# Patient Record
Sex: FEMALE | Race: BLACK OR AFRICAN AMERICAN | NOT HISPANIC OR LATINO | Employment: STUDENT | URBAN - METROPOLITAN AREA
[De-identification: names, ages, dates, MRNs, and addresses within clinical notes are randomized per-mention and may not be internally consistent; named-entity substitution may affect disease eponyms.]

---
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
## 2021-01-06 ENCOUNTER — TELEPHONE (OUTPATIENT)
Dept: FAMILY MEDICINE | Facility: CLINIC | Age: 21
End: 2021-01-06

## 2021-01-06 DIAGNOSIS — Z03.818 ENCOUNTER FOR OBSERVATION FOR SUSPECTED EXPOSURE TO OTHER BIOLOGICAL AGENTS RULED OUT: ICD-10-CM

## 2021-01-09 ENCOUNTER — CLINICAL SUPPORT (OUTPATIENT)
Dept: FAMILY MEDICINE | Facility: CLINIC | Age: 21
End: 2021-01-09
Payer: MEDICAID

## 2021-01-09 DIAGNOSIS — Z03.818 ENCOUNTER FOR OBSERVATION FOR SUSPECTED EXPOSURE TO OTHER BIOLOGICAL AGENTS RULED OUT: ICD-10-CM

## 2021-01-09 PROCEDURE — U0003 INFECTIOUS AGENT DETECTION BY NUCLEIC ACID (DNA OR RNA); SEVERE ACUTE RESPIRATORY SYNDROME CORONAVIRUS 2 (SARS-COV-2) (CORONAVIRUS DISEASE [COVID-19]), AMPLIFIED PROBE TECHNIQUE, MAKING USE OF HIGH THROUGHPUT TECHNOLOGIES AS DESCRIBED BY CMS-2020-01-R: HCPCS

## 2021-01-10 LAB — SARS-COV-2 RNA RESP QL NAA+PROBE: NOT DETECTED

## 2021-04-29 ENCOUNTER — PATIENT MESSAGE (OUTPATIENT)
Dept: RESEARCH | Facility: HOSPITAL | Age: 21
End: 2021-04-29

## 2022-01-07 ENCOUNTER — TELEPHONE (OUTPATIENT)
Dept: FAMILY MEDICINE | Facility: CLINIC | Age: 22
End: 2022-01-07
Payer: COMMERCIAL

## 2022-01-07 NOTE — TELEPHONE ENCOUNTER
We do not have anything in her records regarding her receiving COVID vaccine.  Recommend check with whomever give her the vaccination to see if they have any documentation on this.  Otherwise recommend follow-up with Dr. Miles on this.

## 2022-01-07 NOTE — TELEPHONE ENCOUNTER
----- Message from Henry Kapoor sent at 1/7/2022  9:05 AM CST -----  Contact: self  Conchita Toby would like a call back at [phone], in regards to to if she can can an exemption form for the COVID vaccine, due to the first time she received the vaccine it broke her out in hives, accelerated her heart rate.

## 2022-01-10 ENCOUNTER — TELEPHONE (OUTPATIENT)
Dept: FAMILY MEDICINE | Facility: CLINIC | Age: 22
End: 2022-01-10
Payer: COMMERCIAL

## 2022-01-10 NOTE — TELEPHONE ENCOUNTER
----- Message from Gordo Cantu sent at 1/10/2022  9:21 AM CST -----  Hello,    The following patient is returning office call. She can be contacted at [phone].    Thank you

## 2022-01-19 ENCOUNTER — TELEPHONE (OUTPATIENT)
Dept: FAMILY MEDICINE | Facility: CLINIC | Age: 22
End: 2022-01-19
Payer: COMMERCIAL

## 2022-01-19 NOTE — TELEPHONE ENCOUNTER
Spoke with patient regarding Dr. Copeland message via previous encounter and informed her that we do not have any documentation of a reaction. She states that she received the vaccine at a [facility] and was advised to follow up with PCP regarding the reaction. I offered to schedule appointment with PCP to discuss, but patient stated that she would need the exemption before Friday, or her classes would be dropped. I offered to schedule with another available provider at our [facility] for tomorrow, but patient declined stating that she needed to see someone in [place]. Patient scheduling contact number provided to patient for further assistance in scheduling with a provider to discuss at her location of choice.

## 2022-01-19 NOTE — TELEPHONE ENCOUNTER
----- Message from Henry Kapoor sent at 1/19/2022  2:49 PM CST -----  Contact: timo Santiagosisheron Luis would like a call back at [phone], in regards to her covid exemption form. She states that she has been sending  messages for 3 weeks and has yet to hear anything.

## 2022-01-20 ENCOUNTER — IMMUNIZATION (OUTPATIENT)
Dept: PRIMARY CARE CLINIC | Facility: CLINIC | Age: 22
End: 2022-01-20
Payer: COMMERCIAL

## 2022-01-20 DIAGNOSIS — Z23 NEED FOR VACCINATION: Primary | ICD-10-CM

## 2022-01-20 PROCEDURE — 0002A COVID-19, MRNA, LNP-S, PF, 30 MCG/0.3 ML DOSE VACCINE: CPT | Mod: CV19,PBBFAC | Performed by: FAMILY MEDICINE

## 2022-01-21 ENCOUNTER — NURSE TRIAGE (OUTPATIENT)
Dept: ADMINISTRATIVE | Facility: CLINIC | Age: 22
End: 2022-01-21
Payer: COMMERCIAL

## 2022-01-21 ENCOUNTER — TELEPHONE (OUTPATIENT)
Dept: FAMILY MEDICINE | Facility: CLINIC | Age: 22
End: 2022-01-21
Payer: COMMERCIAL

## 2022-01-21 NOTE — TELEPHONE ENCOUNTER
Pt reports she received 2nd dose for COVID vaccine yesterday, noticed a rash in right hand this morning. States rash is brown in color, feels tight, & is scaly. Care advice recommends pt go to [facility], pt verbalized understanding.     Reason for Disposition   Sounds like a severe, unusual reaction to the triager    Additional Information   Negative: Difficulty breathing or swallowing and starts within 2 hours after injection   Negative: Sounds like a life-threatening emergency to the triager   Negative: Fever > 104 F (40 C)   Negative: [1] Symptoms of COVID-19 (e.g., cough, fever, SOB, or others) AND [2] within 14 days of EXPOSURE (close contact) with diagnosed or suspected COVID-19 patient   Negative: [1] Symptoms of COVID-19 (e.g., cough, fever, SOB, or others) AND [2] within 14 days of being at a crowded indoor or outdoor event (e.g., concert, festival, rally, wedding)   Negative: Typical COVID-19 symptoms (e.g., cough, difficulty breathing, loss of taste or smell, runny nose, sore throat) that are NOT expected from vaccine   Negative: [1] COVID-19 exposure AND [2] no symptoms, or symptoms not typical of COVID-19    Protocols used: CORONAVIRUS (COVID-19) VACCINE QUESTIONS AND [id number]

## 2022-01-21 NOTE — TELEPHONE ENCOUNTER
----- Message from Deborah Hedrick sent at 1/21/2022  1:20 PM CST -----  Contact: Patient, [phone]  Calling because she received her 2nd Covid 19 vaccine yesterday and today she has a scally rash on her hand. Call transferred to On Call nurse. Thanks.

## 2022-01-21 NOTE — TELEPHONE ENCOUNTER
Spoke with patient and she states she is having an allergic reaction to her Covid vaccine. She states she was advised to go to her nearest Urgent Care to be assessed. States she is there awaiting her appointment. Nothing further needed.

## 2022-02-24 ENCOUNTER — TELEPHONE (OUTPATIENT)
Dept: FAMILY MEDICINE | Facility: CLINIC | Age: 22
End: 2022-02-24
Payer: COMMERCIAL

## 2022-02-24 NOTE — TELEPHONE ENCOUNTER
----- Message from Karlajanene Harvey sent at 2/24/2022  2:36 PM CST -----  Pt would like a form she have filled out from her apartment complex. The form is stating her dog is with her for emotional support. Please give her a call at .[phone]. x. El

## 2022-02-25 ENCOUNTER — PATIENT MESSAGE (OUTPATIENT)
Dept: FAMILY MEDICINE | Facility: CLINIC | Age: 22
End: 2022-02-25
Payer: COMMERCIAL

## 2022-02-25 ENCOUNTER — TELEPHONE (OUTPATIENT)
Dept: FAMILY MEDICINE | Facility: CLINIC | Age: 22
End: 2022-02-25
Payer: COMMERCIAL

## 2022-02-25 NOTE — TELEPHONE ENCOUNTER
----- Message from Leroy Franklin sent at 2/25/2022  3:48 PM CST -----  Contact: self [phone]  Pt stated sent information via my chart did not allow her to send through email.    Please call and advise

## 2022-03-15 ENCOUNTER — OFFICE VISIT (OUTPATIENT)
Dept: FAMILY MEDICINE | Facility: CLINIC | Age: 22
End: 2022-03-15
Payer: COMMERCIAL

## 2022-03-15 VITALS
HEART RATE: 92 BPM | WEIGHT: 185.31 LBS | HEIGHT: 64 IN | BODY MASS INDEX: 31.64 KG/M2 | DIASTOLIC BLOOD PRESSURE: 82 MMHG | TEMPERATURE: 99 F | OXYGEN SATURATION: 99 % | SYSTOLIC BLOOD PRESSURE: 114 MMHG

## 2022-03-15 DIAGNOSIS — F41.9 ANXIETY: ICD-10-CM

## 2022-03-15 DIAGNOSIS — E55.9 VITAMIN D DEFICIENCY: ICD-10-CM

## 2022-03-15 DIAGNOSIS — Z00.00 ANNUAL PHYSICAL EXAM: Primary | ICD-10-CM

## 2022-03-15 DIAGNOSIS — Z11.59 NEED FOR HEPATITIS C SCREENING TEST: ICD-10-CM

## 2022-03-15 DIAGNOSIS — Z11.4 ENCOUNTER FOR SCREENING FOR HIV: ICD-10-CM

## 2022-03-15 DIAGNOSIS — Z11.3 SCREENING FOR STD (SEXUALLY TRANSMITTED DISEASE): ICD-10-CM

## 2022-03-15 DIAGNOSIS — E01.0 THYROMEGALY: ICD-10-CM

## 2022-03-15 LAB
BACTERIA #/AREA URNS HPF: ABNORMAL /HPF
BILIRUB UR QL STRIP: NEGATIVE
CLARITY UR: CLEAR
COLOR UR: YELLOW
GLUCOSE UR QL STRIP: NEGATIVE
HGB UR QL STRIP: NEGATIVE
KETONES UR QL STRIP: NEGATIVE
LEUKOCYTE ESTERASE UR QL STRIP: NEGATIVE
MICROSCOPIC COMMENT: ABNORMAL
NITRITE UR QL STRIP: POSITIVE
PH UR STRIP: 8 [PH] (ref 5–8)
PROT UR QL STRIP: NEGATIVE
RBC #/AREA URNS HPF: 0 /HPF (ref 0–4)
SP GR UR STRIP: 1.01 (ref 1–1.03)
SQUAMOUS #/AREA URNS HPF: 3 /HPF
URN SPEC COLLECT METH UR: ABNORMAL
WBC #/AREA URNS HPF: 3 /HPF (ref 0–5)

## 2022-03-15 PROCEDURE — 99999 PR PBB SHADOW E&M-EST. PATIENT-LVL V: ICD-10-PCS | Mod: PBBFAC,,, | Performed by: NURSE PRACTITIONER

## 2022-03-15 PROCEDURE — 87491 CHLMYD TRACH DNA AMP PROBE: CPT | Performed by: NURSE PRACTITIONER

## 2022-03-15 PROCEDURE — 3074F SYST BP LT 130 MM HG: CPT | Mod: CPTII,S$GLB,, | Performed by: NURSE PRACTITIONER

## 2022-03-15 PROCEDURE — 3079F DIAST BP 80-89 MM HG: CPT | Mod: CPTII,S$GLB,, | Performed by: NURSE PRACTITIONER

## 2022-03-15 PROCEDURE — 99999 PR PBB SHADOW E&M-EST. PATIENT-LVL V: CPT | Mod: PBBFAC,,, | Performed by: NURSE PRACTITIONER

## 2022-03-15 PROCEDURE — 1160F RVW MEDS BY RX/DR IN RCRD: CPT | Mod: CPTII,S$GLB,, | Performed by: NURSE PRACTITIONER

## 2022-03-15 PROCEDURE — 99395 PREV VISIT EST AGE 18-39: CPT | Mod: S$GLB,,, | Performed by: NURSE PRACTITIONER

## 2022-03-15 PROCEDURE — 1160F PR REVIEW ALL MEDS BY PRESCRIBER/CLIN PHARMACIST DOCUMENTED: ICD-10-PCS | Mod: CPTII,S$GLB,, | Performed by: NURSE PRACTITIONER

## 2022-03-15 PROCEDURE — 3074F PR MOST RECENT SYSTOLIC BLOOD PRESSURE < 130 MM HG: ICD-10-PCS | Mod: CPTII,S$GLB,, | Performed by: NURSE PRACTITIONER

## 2022-03-15 PROCEDURE — 1159F MED LIST DOCD IN RCRD: CPT | Mod: CPTII,S$GLB,, | Performed by: NURSE PRACTITIONER

## 2022-03-15 PROCEDURE — 3079F PR MOST RECENT DIASTOLIC BLOOD PRESSURE 80-89 MM HG: ICD-10-PCS | Mod: CPTII,S$GLB,, | Performed by: NURSE PRACTITIONER

## 2022-03-15 PROCEDURE — 87591 N.GONORRHOEAE DNA AMP PROB: CPT | Performed by: NURSE PRACTITIONER

## 2022-03-15 PROCEDURE — 1159F PR MEDICATION LIST DOCUMENTED IN MEDICAL RECORD: ICD-10-PCS | Mod: CPTII,S$GLB,, | Performed by: NURSE PRACTITIONER

## 2022-03-15 PROCEDURE — 3008F PR BODY MASS INDEX (BMI) DOCUMENTED: ICD-10-PCS | Mod: CPTII,S$GLB,, | Performed by: NURSE PRACTITIONER

## 2022-03-15 PROCEDURE — 81000 URINALYSIS NONAUTO W/SCOPE: CPT | Mod: PO | Performed by: NURSE PRACTITIONER

## 2022-03-15 PROCEDURE — 3008F BODY MASS INDEX DOCD: CPT | Mod: CPTII,S$GLB,, | Performed by: NURSE PRACTITIONER

## 2022-03-15 PROCEDURE — 99395 PR PREVENTIVE VISIT,EST,18-39: ICD-10-PCS | Mod: S$GLB,,, | Performed by: NURSE PRACTITIONER

## 2022-03-15 RX ORDER — NORETHINDRONE ACETATE/ETHINYL ESTRADIOL AND FERROUS FUMARATE 1MG-20(24)
1 KIT ORAL DAILY
Status: ON HOLD | COMMUNITY
Start: 2022-03-10 | End: 2023-12-29 | Stop reason: HOSPADM

## 2022-03-15 NOTE — PATIENT INSTRUCTIONS
Call psychiatry for an appt, see the list of numbers provided  Continue current medications  RTC as needed  Report to ER immediately if symptoms worsen
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
## 2022-03-17 ENCOUNTER — TELEPHONE (OUTPATIENT)
Dept: FAMILY MEDICINE | Facility: CLINIC | Age: 22
End: 2022-03-17
Payer: COMMERCIAL

## 2022-03-17 DIAGNOSIS — R82.90 ABNORMAL URINE FINDINGS: Primary | ICD-10-CM

## 2022-03-17 LAB
C TRACH DNA SPEC QL NAA+PROBE: NOT DETECTED
N GONORRHOEA DNA SPEC QL NAA+PROBE: NOT DETECTED

## 2022-03-18 ENCOUNTER — TELEPHONE (OUTPATIENT)
Dept: FAMILY MEDICINE | Facility: CLINIC | Age: 22
End: 2022-03-18
Payer: COMMERCIAL

## 2022-03-18 RX ORDER — NITROFURANTOIN 25; 75 MG/1; MG/1
100 CAPSULE ORAL 2 TIMES DAILY
Qty: 10 CAPSULE | Refills: 0 | Status: SHIPPED | OUTPATIENT
Start: 2022-03-18 | End: 2022-03-23

## 2022-04-01 ENCOUNTER — TELEPHONE (OUTPATIENT)
Dept: FAMILY MEDICINE | Facility: CLINIC | Age: 22
End: 2022-04-01
Payer: COMMERCIAL

## 2022-04-01 ENCOUNTER — PATIENT MESSAGE (OUTPATIENT)
Dept: FAMILY MEDICINE | Facility: CLINIC | Age: 22
End: 2022-04-01
Payer: COMMERCIAL

## 2022-04-01 NOTE — TELEPHONE ENCOUNTER
----- Message from Jessica Cabrera sent at 4/1/2022  9:31 AM CDT -----  Contact: Conchita  Patient is calling to speak with the nurse regarding the request for signed form for patient residential needs for a service animal. Reports the form was sent through the [employer] portal to both NADYA Fox and Dr Miles. Patient is requesting a call back at [phone] today to confirm.   Thanks,  RP

## 2022-05-27 ENCOUNTER — LAB VISIT (OUTPATIENT)
Dept: LAB | Facility: HOSPITAL | Age: 22
End: 2022-05-27
Attending: NURSE PRACTITIONER
Payer: COMMERCIAL

## 2022-05-27 ENCOUNTER — PATIENT MESSAGE (OUTPATIENT)
Dept: FAMILY MEDICINE | Facility: CLINIC | Age: 22
End: 2022-05-27
Payer: COMMERCIAL

## 2022-05-27 DIAGNOSIS — Z11.3 SCREENING FOR STD (SEXUALLY TRANSMITTED DISEASE): ICD-10-CM

## 2022-05-27 DIAGNOSIS — Z11.59 NEED FOR HEPATITIS C SCREENING TEST: ICD-10-CM

## 2022-05-27 DIAGNOSIS — Z00.00 ANNUAL PHYSICAL EXAM: ICD-10-CM

## 2022-05-27 DIAGNOSIS — E55.9 VITAMIN D DEFICIENCY: ICD-10-CM

## 2022-05-27 DIAGNOSIS — Z11.4 ENCOUNTER FOR SCREENING FOR HIV: ICD-10-CM

## 2022-05-27 LAB
25(OH)D3+25(OH)D2 SERPL-MCNC: 9 NG/ML (ref 30–96)
ALBUMIN SERPL BCP-MCNC: 3.9 G/DL (ref 3.5–5.2)
ALP SERPL-CCNC: 74 U/L (ref 55–135)
ALT SERPL W/O P-5'-P-CCNC: 17 U/L (ref 10–44)
ANION GAP SERPL CALC-SCNC: 8 MMOL/L (ref 8–16)
AST SERPL-CCNC: 16 U/L (ref 10–40)
BASOPHILS # BLD AUTO: 0.04 K/UL (ref 0–0.2)
BASOPHILS NFR BLD: 0.9 % (ref 0–1.9)
BILIRUB SERPL-MCNC: 0.3 MG/DL (ref 0.1–1)
BUN SERPL-MCNC: 10 MG/DL (ref 6–20)
CALCIUM SERPL-MCNC: 9.8 MG/DL (ref 8.7–10.5)
CHLORIDE SERPL-SCNC: 104 MMOL/L (ref 95–110)
CHOLEST SERPL-MCNC: 176 MG/DL (ref 120–199)
CHOLEST/HDLC SERPL: 4.2 {RATIO} (ref 2–5)
CO2 SERPL-SCNC: 25 MMOL/L (ref 23–29)
CREAT SERPL-MCNC: 0.8 MG/DL (ref 0.5–1.4)
DIFFERENTIAL METHOD: NORMAL
EOSINOPHIL # BLD AUTO: 0.1 K/UL (ref 0–0.5)
EOSINOPHIL NFR BLD: 1.6 % (ref 0–8)
ERYTHROCYTE [DISTWIDTH] IN BLOOD BY AUTOMATED COUNT: 13.2 % (ref 11.5–14.5)
EST. GFR  (AFRICAN AMERICAN): >60 ML/MIN/1.73 M^2
EST. GFR  (NON AFRICAN AMERICAN): >60 ML/MIN/1.73 M^2
GLUCOSE SERPL-MCNC: 91 MG/DL (ref 70–110)
HCT VFR BLD AUTO: 42.7 % (ref 37–48.5)
HDLC SERPL-MCNC: 42 MG/DL (ref 40–75)
HDLC SERPL: 23.9 % (ref 20–50)
HGB BLD-MCNC: 13.9 G/DL (ref 12–16)
IMM GRANULOCYTES # BLD AUTO: 0 K/UL (ref 0–0.04)
IMM GRANULOCYTES NFR BLD AUTO: 0 % (ref 0–0.5)
LDLC SERPL CALC-MCNC: 118.2 MG/DL (ref 63–159)
LYMPHOCYTES # BLD AUTO: 2 K/UL (ref 1–4.8)
LYMPHOCYTES NFR BLD: 47.2 % (ref 18–48)
MCH RBC QN AUTO: 29.9 PG (ref 27–31)
MCHC RBC AUTO-ENTMCNC: 32.6 G/DL (ref 32–36)
MCV RBC AUTO: 92 FL (ref 82–98)
MONOCYTES # BLD AUTO: 0.3 K/UL (ref 0.3–1)
MONOCYTES NFR BLD: 7.4 % (ref 4–15)
NEUTROPHILS # BLD AUTO: 1.9 K/UL (ref 1.8–7.7)
NEUTROPHILS NFR BLD: 42.9 % (ref 38–73)
NONHDLC SERPL-MCNC: 134 MG/DL
NRBC BLD-RTO: 0 /100 WBC
PLATELET # BLD AUTO: 322 K/UL (ref 150–450)
PMV BLD AUTO: 10.7 FL (ref 9.2–12.9)
POTASSIUM SERPL-SCNC: 3.6 MMOL/L (ref 3.5–5.1)
PROT SERPL-MCNC: 7.1 G/DL (ref 6–8.4)
RBC # BLD AUTO: 4.65 M/UL (ref 4–5.4)
SODIUM SERPL-SCNC: 137 MMOL/L (ref 136–145)
TRIGL SERPL-MCNC: 79 MG/DL (ref 30–150)
TSH SERPL DL<=0.005 MIU/L-ACNC: 2.24 UIU/ML (ref 0.4–4)
WBC # BLD AUTO: 4.32 K/UL (ref 3.9–12.7)

## 2022-05-27 PROCEDURE — 86592 SYPHILIS TEST NON-TREP QUAL: CPT | Performed by: NURSE PRACTITIONER

## 2022-05-27 PROCEDURE — 80053 COMPREHEN METABOLIC PANEL: CPT | Performed by: NURSE PRACTITIONER

## 2022-05-27 PROCEDURE — 86696 HERPES SIMPLEX TYPE 2 TEST: CPT | Performed by: NURSE PRACTITIONER

## 2022-05-27 PROCEDURE — 86695 HERPES SIMPLEX TYPE 1 TEST: CPT | Performed by: NURSE PRACTITIONER

## 2022-05-27 PROCEDURE — 87389 HIV-1 AG W/HIV-1&-2 AB AG IA: CPT | Performed by: NURSE PRACTITIONER

## 2022-05-27 PROCEDURE — 36415 COLL VENOUS BLD VENIPUNCTURE: CPT | Mod: PO | Performed by: NURSE PRACTITIONER

## 2022-05-27 PROCEDURE — 80061 LIPID PANEL: CPT | Performed by: NURSE PRACTITIONER

## 2022-05-27 PROCEDURE — 80074 ACUTE HEPATITIS PANEL: CPT | Performed by: NURSE PRACTITIONER

## 2022-05-27 PROCEDURE — 85025 COMPLETE CBC W/AUTO DIFF WBC: CPT | Performed by: NURSE PRACTITIONER

## 2022-05-27 PROCEDURE — 84443 ASSAY THYROID STIM HORMONE: CPT | Performed by: NURSE PRACTITIONER

## 2022-05-27 PROCEDURE — 82306 VITAMIN D 25 HYDROXY: CPT | Performed by: NURSE PRACTITIONER

## 2022-05-28 LAB — RPR SER QL: NORMAL

## 2022-05-30 LAB
HAV IGM SERPL QL IA: NEGATIVE
HBV CORE IGM SERPL QL IA: NEGATIVE
HBV SURFACE AG SERPL QL IA: NEGATIVE
HCV AB SERPL QL IA: NEGATIVE
HCV AB SERPL QL IA: NEGATIVE
HIV 1+2 AB+HIV1 P24 AG SERPL QL IA: NEGATIVE

## 2022-06-01 DIAGNOSIS — E55.9 VITAMIN D DEFICIENCY: ICD-10-CM

## 2022-06-01 LAB
HSV1 IGG SERPL QL IA: POSITIVE
HSV2 IGG SERPL QL IA: NEGATIVE

## 2022-06-01 NOTE — TELEPHONE ENCOUNTER
----- Message from Dominique Arauz NP sent at 5/31/2022  7:53 AM CDT -----  Per nurse response, pt not taking vitamin D. Recommend restarting at current dose; repeat vit D in 3 m.

## 2022-06-02 RX ORDER — VIT C/E/ZN/COPPR/LUTEIN/ZEAXAN 250MG-90MG
1000 CAPSULE ORAL DAILY
Qty: 90 CAPSULE | Refills: 1 | Status: SHIPPED | OUTPATIENT
Start: 2022-06-02 | End: 2023-02-09 | Stop reason: SDUPTHER

## 2022-07-21 ENCOUNTER — HOSPITAL ENCOUNTER (OUTPATIENT)
Dept: RADIOLOGY | Facility: HOSPITAL | Age: 22
Discharge: HOME | End: 2022-07-21
Attending: NURSE PRACTITIONER
Payer: COMMERCIAL

## 2022-07-21 DIAGNOSIS — E01.0 THYROMEGALY: ICD-10-CM

## 2022-07-21 PROCEDURE — 76536 US EXAM OF HEAD AND NECK: CPT | Mod: 26,,, | Performed by: RADIOLOGY

## 2022-07-21 PROCEDURE — 76536 US EXAM OF HEAD AND NECK: CPT | Mod: TC,PO

## 2022-07-21 PROCEDURE — 76536 US SOFT TISSUE HEAD NECK THYROID: ICD-10-PCS | Mod: 26,,, | Performed by: RADIOLOGY

## 2022-08-09 ENCOUNTER — OFFICE VISIT (OUTPATIENT)
Dept: FAMILY MEDICINE | Facility: CLINIC | Age: 22
End: 2022-08-09
Payer: COMMERCIAL

## 2022-08-09 VITALS
OXYGEN SATURATION: 97 % | SYSTOLIC BLOOD PRESSURE: 120 MMHG | TEMPERATURE: 98 F | DIASTOLIC BLOOD PRESSURE: 70 MMHG | BODY MASS INDEX: 31.19 KG/M2 | WEIGHT: 182.69 LBS | HEART RATE: 92 BPM | HEIGHT: 64 IN

## 2022-08-09 DIAGNOSIS — N89.8 VAGINAL DISCHARGE: Primary | ICD-10-CM

## 2022-08-09 DIAGNOSIS — N91.2 AMENORRHEA: ICD-10-CM

## 2022-08-09 LAB — B-HCG UR QL: NEGATIVE

## 2022-08-09 PROCEDURE — 1159F MED LIST DOCD IN RCRD: CPT | Mod: CPTII,S$GLB,, | Performed by: PHYSICIAN ASSISTANT

## 2022-08-09 PROCEDURE — 87491 CHLMYD TRACH DNA AMP PROBE: CPT | Performed by: PHYSICIAN ASSISTANT

## 2022-08-09 PROCEDURE — 87591 N.GONORRHOEAE DNA AMP PROB: CPT | Performed by: PHYSICIAN ASSISTANT

## 2022-08-09 PROCEDURE — 3078F PR MOST RECENT DIASTOLIC BLOOD PRESSURE < 80 MM HG: ICD-10-PCS | Mod: CPTII,S$GLB,, | Performed by: PHYSICIAN ASSISTANT

## 2022-08-09 PROCEDURE — 1160F RVW MEDS BY RX/DR IN RCRD: CPT | Mod: CPTII,S$GLB,, | Performed by: PHYSICIAN ASSISTANT

## 2022-08-09 PROCEDURE — 3078F DIAST BP <80 MM HG: CPT | Mod: CPTII,S$GLB,, | Performed by: PHYSICIAN ASSISTANT

## 2022-08-09 PROCEDURE — 3008F BODY MASS INDEX DOCD: CPT | Mod: CPTII,S$GLB,, | Performed by: PHYSICIAN ASSISTANT

## 2022-08-09 PROCEDURE — 1159F PR MEDICATION LIST DOCUMENTED IN MEDICAL RECORD: ICD-10-PCS | Mod: CPTII,S$GLB,, | Performed by: PHYSICIAN ASSISTANT

## 2022-08-09 PROCEDURE — 99213 PR OFFICE/OUTPT VISIT, EST, LEVL III, 20-29 MIN: ICD-10-PCS | Mod: S$GLB,,, | Performed by: PHYSICIAN ASSISTANT

## 2022-08-09 PROCEDURE — 1160F PR REVIEW ALL MEDS BY PRESCRIBER/CLIN PHARMACIST DOCUMENTED: ICD-10-PCS | Mod: CPTII,S$GLB,, | Performed by: PHYSICIAN ASSISTANT

## 2022-08-09 PROCEDURE — 99213 OFFICE O/P EST LOW 20 MIN: CPT | Mod: S$GLB,,, | Performed by: PHYSICIAN ASSISTANT

## 2022-08-09 PROCEDURE — 99999 PR PBB SHADOW E&M-EST. PATIENT-LVL IV: CPT | Mod: PBBFAC,,, | Performed by: PHYSICIAN ASSISTANT

## 2022-08-09 PROCEDURE — 3074F SYST BP LT 130 MM HG: CPT | Mod: CPTII,S$GLB,, | Performed by: PHYSICIAN ASSISTANT

## 2022-08-09 PROCEDURE — 3074F PR MOST RECENT SYSTOLIC BLOOD PRESSURE < 130 MM HG: ICD-10-PCS | Mod: CPTII,S$GLB,, | Performed by: PHYSICIAN ASSISTANT

## 2022-08-09 PROCEDURE — 81025 URINE PREGNANCY TEST: CPT | Performed by: PHYSICIAN ASSISTANT

## 2022-08-09 PROCEDURE — 3008F PR BODY MASS INDEX (BMI) DOCUMENTED: ICD-10-PCS | Mod: CPTII,S$GLB,, | Performed by: PHYSICIAN ASSISTANT

## 2022-08-09 PROCEDURE — 99999 PR PBB SHADOW E&M-EST. PATIENT-LVL IV: ICD-10-PCS | Mod: PBBFAC,,, | Performed by: PHYSICIAN ASSISTANT

## 2022-08-09 RX ORDER — VALACYCLOVIR HYDROCHLORIDE 1 G/1
1000 TABLET, FILM COATED ORAL DAILY
Status: ON HOLD | COMMUNITY
Start: 2022-05-26 | End: 2024-01-29

## 2022-08-09 NOTE — PROGRESS NOTES
Assessment/Plan:    Problem List Items Addressed This Visit        Renal/    Vaginal discharge - Primary    Overview     -symptoms likely due to vaginitis   -will obtain vaginosis screen and STD testing  -follow up if no improvement or worsening of symptoms             Relevant Orders    C. trachomatis/N. gonorrhoeae by AMP DNA    VAGINOSIS SCREEN BY DNA PROBE    Amenorrhea    Overview     -LMP >4 months ago  -will check UPT to r/o pregnancy  -refer to GYN for further evaluation           Relevant Orders    Pregnancy, urine rapid    Ambulatory referral/consult to Gynecology          Follow up if symptoms worsen or fail to improve.    Sailaja Waktins PA-C  _____________________________________________________________________________________________________________________________________________________    CC: vaginal discharge     HPI: Patient is in clinic today as an established patient here for evaluation of abnormal vaginal discharge. Symptom onset was about 4 days ago. She reports associated vaginal irritation and pruritis. She denies fever, chills, blisters, bumps, burning, bleeding, dyspareunia, lesions, odor, pain, post coital bleeding, or urinary symptoms. STI Risk: Possible STD exposure. She has tried Monistat cream due to suspected yeast infection with no improvement in symptoms. She stated that her LMP was about 4 months ago. She was previously on oral contraceptives, but discontinued about 4 months ago. She reports having 2 cycles per month prior to discontinuing her birth control. She reports taking a home pregnancy test last month which was negative. She is not established with GYN. No other complaints today.     Past Medical History:   Diagnosis Date    Migraine      History reviewed. No pertinent surgical history.  Review of patient's allergies indicates:   Allergen Reactions    Covid-19 vaccine, mrna, [id number], lnp-s (pfizer) Rash     Social History     Tobacco Use    Smoking status: Never  "Smoker    Smokeless tobacco: Never Used   Substance Use Topics    Alcohol use: Not Currently     Family History   Problem Relation Age of Onset    Diabetes Maternal Grandmother     Glaucoma Maternal Grandmother      Current Outpatient Medications on File Prior to Visit   Medication Sig Dispense Refill    valACYclovir (VALTREX) 1000 MG tablet Take 1,000 mg by mouth once daily.      cholecalciferol, vitamin D3, (VITAMIN D3) 25 mcg (1,000 unit) capsule Take 1 capsule (1,000 Units total) by mouth once daily. (Patient not taking: Reported on 8/9/2022) 90 capsule 1    desogestreL-ethinyl estradioL (APRI) 0.15-0.03 mg per tablet Take 1 tablet by mouth.      KARIVA, 28, 0.15-0.02 mgx21 /0.01 mg x 5 per tablet Take 1 tablet by mouth once daily. (Patient not taking: No sig reported) 27 tablet 11    JOSE RAMON 24 FE 1 mg-20 mcg (24)/75 mg (4) per tablet Take 1 tablet by mouth once daily.       No current facility-administered medications on file prior to visit.       Review of Systems   Constitutional: Negative for chills, diaphoresis, fatigue and fever.   HENT: Negative for congestion, ear pain, postnasal drip, sinus pain and sore throat.    Eyes: Negative for pain and redness.   Respiratory: Negative for cough, chest tightness and shortness of breath.    Cardiovascular: Negative for chest pain and leg swelling.   Gastrointestinal: Negative for abdominal pain, constipation, diarrhea, nausea and vomiting.   Genitourinary: Positive for menstrual problem and vaginal discharge. Negative for dyspareunia, dysuria, flank pain, frequency, genital sores, hematuria, pelvic pain, urgency, vaginal bleeding and vaginal pain.   Musculoskeletal: Negative for arthralgias and joint swelling.   Skin: Negative for rash.   Neurological: Negative for dizziness, syncope and headaches.       Vitals:    08/09/22 0745   BP: 120/70   Pulse: 92   Temp: 98.3 °F (36.8 °C)   SpO2: 97%   Weight: 82.9 kg (182 lb 11.2 oz)   Height: 5' 4" (1.626 m) "       Wt Readings from Last 3 Encounters:   08/09/22 82.9 kg (182 lb 11.2 oz)   03/15/22 84.1 kg (185 lb 4.8 oz)   11/06/20 83.1 kg (183 lb 3.2 oz) (95 %, Z= 1.69)*     * Growth percentiles are based on [facility] (Girls, 2-20 Years) data.       Physical Exam  Constitutional:       General: She is not in acute distress.     Appearance: Normal appearance. She is well-developed.   HENT:      Head: Normocephalic and atraumatic.   Eyes:      Conjunctiva/sclera: Conjunctivae normal.   Cardiovascular:      Rate and Rhythm: Normal rate and regular rhythm.      Pulses: Normal pulses.      Heart sounds: Normal heart sounds. No murmur heard.  Pulmonary:      Effort: Pulmonary effort is normal. No respiratory distress.      Breath sounds: Normal breath sounds.   Abdominal:      General: Bowel sounds are normal. There is no distension.      Palpations: Abdomen is soft.      Tenderness: There is no abdominal tenderness.   Musculoskeletal:         General: Normal range of motion.      Cervical back: Normal range of motion and neck supple.   Skin:     General: Skin is warm and dry.      Findings: No rash.   Neurological:      General: No focal deficit present.      Mental Status: She is alert and oriented to person, place, and time.         Health Maintenance   Topic Date Due    Chlamydia Screening  03/15/2023    TETANUS VACCINE  10/14/2023    Hepatitis C Screening  Completed    Lipid Panel  Completed    HPV Vaccines  Completed

## 2022-08-10 ENCOUNTER — TELEPHONE (OUTPATIENT)
Dept: FAMILY MEDICINE | Facility: CLINIC | Age: 22
End: 2022-08-10
Payer: COMMERCIAL

## 2022-08-10 DIAGNOSIS — N89.8 VAGINAL DISCHARGE: Primary | ICD-10-CM

## 2022-08-10 DIAGNOSIS — A74.9 CHLAMYDIA: ICD-10-CM

## 2022-08-10 LAB
C TRACH DNA SPEC QL NAA+PROBE: DETECTED
N GONORRHOEA DNA SPEC QL NAA+PROBE: NOT DETECTED

## 2022-08-10 RX ORDER — DOXYCYCLINE HYCLATE 100 MG
100 TABLET ORAL 2 TIMES DAILY
Qty: 14 TABLET | Refills: 0 | Status: SHIPPED | OUTPATIENT
Start: 2022-08-10 | End: 2022-08-17

## 2022-08-10 NOTE — TELEPHONE ENCOUNTER
Please let the patient know that I have sent antibiotics to her pharmacy for recent test results. I have also put in another order for vaginosis screen. Please have her come to the clinic for testing.     I have signed for the following orders AND/OR meds.  Please call the patient and ask the patient to schedule the testing AND/OR inform about any medications that were sent. Medications have been sent to pharmacy listed below      Orders Placed This Encounter   Procedures    VAGINOSIS SCREEN BY DNA PROBE     Standing Status:   Future     Standing Expiration Date:   10/9/2023     Order Specific Question:   Release to patient     Answer:   Immediate       Medications Ordered This Encounter   Medications    doxycycline (VIBRA-TABS) 100 MG tablet     Sig: Take 1 tablet (100 mg total) by mouth 2 (two) times daily. for 7 days     Dispense:  14 tablet     Refill:  0         [facility] - [id number] [facility]  [place] [address]  Phone: [phone] Fax: [phone]

## 2022-08-10 NOTE — TELEPHONE ENCOUNTER
Spoke with patient. Patient states that she would like it to be sent to [facility] and she will have a friend pick it up for her .

## 2022-08-10 NOTE — TELEPHONE ENCOUNTER
Please let the patient know that I am waiting on other results before sending in her medication for treatment. Please find out which pharmacy she would like for me to send the medication to. Thanks.

## 2022-08-10 NOTE — PROGRESS NOTES
Results have been released via [facility]. Please verify that these have been viewed by patient. If not, please call patient with results.     I have sent a message to them with the following interpretation (see below).    I have reviewed your recent STD screen which detected Chlamydia. This is a sexually transmitted disease and I highly recommend that your partner gets treated also. I will send in an antibiotic to your pharmacy for you to start taking. Gonorrhea was not detected and your pregnancy test was negative.     Please do not hesitate to call or message with any additional questions or concerns.    Sailaja Watkins PA-C

## 2022-08-10 NOTE — TELEPHONE ENCOUNTER
Good morning,    I reviewed my test results on the [facility] and I would like to know if medication could be delivered (mailed)? My mom tested positive for Covid at the time of using my vehicle in result I would not be able to pick it up at a pharmacy. Thank you in advance for your help

## 2022-08-10 NOTE — TELEPHONE ENCOUNTER
Spoke with patient and notified her of results. Patient reports unable to come do vaginal swab today but will give us a call tomorrow and try to come then.

## 2022-08-16 ENCOUNTER — TELEPHONE (OUTPATIENT)
Dept: FAMILY MEDICINE | Facility: CLINIC | Age: 22
End: 2022-08-16
Payer: COMMERCIAL

## 2022-08-16 NOTE — TELEPHONE ENCOUNTER
Patient was only treated with doxycyline for infection. Please have patient come in for vaginosis screen as ordered last week.

## 2022-08-16 NOTE — TELEPHONE ENCOUNTER
Pt states that she still has a yeast infection after taking all of meds prescribed. Pt asking if she should come in to complete another swab or urine specimen. Please advise.

## 2022-08-17 ENCOUNTER — PATIENT MESSAGE (OUTPATIENT)
Dept: FAMILY MEDICINE | Facility: CLINIC | Age: 22
End: 2022-08-17
Payer: COMMERCIAL

## 2022-11-09 ENCOUNTER — E-VISIT (OUTPATIENT)
Dept: FAMILY MEDICINE | Facility: CLINIC | Age: 22
End: 2022-11-09
Payer: COMMERCIAL

## 2022-11-09 ENCOUNTER — LAB VISIT (OUTPATIENT)
Dept: LAB | Facility: HOSPITAL | Age: 22
End: 2022-11-09
Attending: INTERNAL MEDICINE
Payer: COMMERCIAL

## 2022-11-09 ENCOUNTER — PATIENT MESSAGE (OUTPATIENT)
Dept: FAMILY MEDICINE | Facility: CLINIC | Age: 22
End: 2022-11-09

## 2022-11-09 DIAGNOSIS — R82.90 ABNORMAL URINE: Primary | ICD-10-CM

## 2022-11-09 DIAGNOSIS — B96.89 BV (BACTERIAL VAGINOSIS): ICD-10-CM

## 2022-11-09 DIAGNOSIS — A74.9 CHLAMYDIA: ICD-10-CM

## 2022-11-09 DIAGNOSIS — N76.0 BV (BACTERIAL VAGINOSIS): ICD-10-CM

## 2022-11-09 DIAGNOSIS — R82.90 ABNORMAL URINE: ICD-10-CM

## 2022-11-09 DIAGNOSIS — N89.8 VAGINAL DISCHARGE: ICD-10-CM

## 2022-11-09 DIAGNOSIS — Z20.2 POSSIBLE EXPOSURE TO STD: ICD-10-CM

## 2022-11-09 LAB
BACTERIA #/AREA URNS HPF: ABNORMAL /HPF
BILIRUB UR QL STRIP: NEGATIVE
CLARITY UR: CLEAR
COLOR UR: YELLOW
GLUCOSE UR QL STRIP: NEGATIVE
HGB UR QL STRIP: NEGATIVE
KETONES UR QL STRIP: NEGATIVE
LEUKOCYTE ESTERASE UR QL STRIP: NEGATIVE
MICROSCOPIC COMMENT: ABNORMAL
NITRITE UR QL STRIP: NEGATIVE
PH UR STRIP: 7.5 [PH] (ref 5–8)
PROT UR QL STRIP: NEGATIVE
RBC #/AREA URNS HPF: 1 /HPF (ref 0–4)
SP GR UR STRIP: 1.01 (ref 1–1.03)
SQUAMOUS #/AREA URNS HPF: 4 /HPF
URN SPEC COLLECT METH UR: NORMAL
WBC #/AREA URNS HPF: 1 /HPF (ref 0–5)

## 2022-11-09 PROCEDURE — 87591 N.GONORRHOEAE DNA AMP PROB: CPT | Performed by: NURSE PRACTITIONER

## 2022-11-09 PROCEDURE — 87491 CHLMYD TRACH DNA AMP PROBE: CPT | Performed by: NURSE PRACTITIONER

## 2022-11-09 PROCEDURE — 87086 URINE CULTURE/COLONY COUNT: CPT | Performed by: NURSE PRACTITIONER

## 2022-11-09 PROCEDURE — 99213 PR OFFICE/OUTPT VISIT, EST, LEVL III, 20-29 MIN: ICD-10-PCS | Mod: S$GLB,,, | Performed by: NURSE PRACTITIONER

## 2022-11-09 PROCEDURE — 99213 OFFICE O/P EST LOW 20 MIN: CPT | Mod: S$GLB,,, | Performed by: NURSE PRACTITIONER

## 2022-11-09 PROCEDURE — 81000 URINALYSIS NONAUTO W/SCOPE: CPT | Mod: PO | Performed by: NURSE PRACTITIONER

## 2022-11-09 PROCEDURE — 81514 NFCT DS BV&VAGINITIS DNA ALG: CPT | Performed by: PHYSICIAN ASSISTANT

## 2022-11-09 NOTE — PROGRESS NOTES
Patient ID: Conchita Luis is a 21 y.o. female.    Chief Complaint: STD CHECK    The patient initiated a request through [employer] on 11/9/2022 for evaluation and management with a chief complaint of STD CHECK     I evaluated the questionnaire submission on 11/09/2022  .    [facility] Peq Evisit Uti Questionnaire    11/9/2022  8:22 AM CST - Filed by Patient   Do you agree to participate in an E-Visit? Yes   If you have any of the following problems, please present to your local ER or call 911:  I acknowledge   What is the main issue that you would like for your doctor to address today? std pannel   Are you able to take your vital signs? No   What symptoms do you currently have? Change in urine appearance or smell   When did your symptoms first appear? 11/7/2022   List what you have done or taken to help your symptoms. none   Please indicate whether you have had the following symptoms during the past 24 hours     Urgent urination (a sudden and uncontrollable urge to urinate) None   Frequent urination of small amounts of urine (going to the toilet very often) None   Burning pain when urinating None   Incomplete bladder emptying (still feel like you need to urinate again after urination) None   Pain not associated with urination in the lower abdomen below the belly button) None   What does your urine look like? Cloudy   Blood seen in the urine (without menstrual cycle) None   Flank pain (pain in one or both sides of the lower back) None   Abnormal Vaginal Discharge (abnormal amount, color and/or odor) Moderate   Discharge from the urethra (urinary opening) without urination None   High body temperature/fever? None-<99.5   Please rate how much discomfort you have experience because of the symptoms in the past 24 hours: None   Please indicate how the symptoms have interfered with your every day activities/work in the past 24 hours: None   Please indicate how these symptoms have interfered with your social activities (visiting  people, meeting with friends, etc.) in the past 24 hours? None   Are you a diabetic? Maybe   If you are female, please indicate whether you have the following at the time of completion of this questionnaire: Menstruation (menses)   Is there a chance you could be pregnant? No   Provide any information you feel is important to your history not asked above    Please attach any relevant images or files (if you have performed a home test for UTI, please submit a photo of results)           Active Problem List with Overview Notes    Diagnosis Date Noted    Vaginal discharge 08/09/2022     -symptoms likely due to vaginitis   -will obtain vaginosis screen and STD testing  -follow up if no improvement or worsening of symptoms        Amenorrhea 08/09/2022     -LMP >4 months ago  -will check UPT to r/o pregnancy  -refer to GYN for further evaluation      Iron deficiency anemia due to chronic blood loss 11/11/2020    Vitamin D deficiency 11/11/2020    Fatigue 11/08/2020     -recent symptoms of fatigue  -also not sleeping well due to living arrangements. Discussed having a discussion with her roommate to improve this  -will check TSH. Checking A1C given hx of prediabetes. Will also check CBC and vit D      Encounter for other contraceptive management 12/31/2019    Hallucination 12/31/2019     Chronic hx of visual and auditory hallucinations. Also having symptoms of depression. Denies SI or HI. Routine labs today and will includie TSH. Request for psych referral for further eval. Referral order placed        Recent Labs Obtained:  No visits with results within 7 Day(s) from this visit.   Latest known visit with results is:   Office Visit on 08/09/2022   Component Date Value Ref Range Status    Preg Test, Ur 08/09/2022 Negative   Final    Chlamydia, Amplified DNA 08/09/2022 Detected (A)  Not Detected Final    N gonorrhoeae, amplified DNA 08/09/2022 Not Detected  Not Detected Final       Encounter Diagnoses   Name Primary?     Abnormal urine Yes    Possible exposure to STD         Orders Placed This Encounter   Procedures    Urine culture     Standing Status:   Future     Number of Occurrences:   1     Standing Expiration Date:   5/8/2023    C. trachomatis/N. gonorrhoeae by AMP DNA     Order Specific Question:   Source:     Answer:   Urine    Urinalysis     Specimen Source->Urine     Order Specific Question:   Collection Type     Answer:   Urine, Clean Catch    Urinalysis Microscopic     Specimen Source->Urine     Order Specific Question:   Specimen Source     Answer:   Urine   Message sent to pt informing if requesting full STD panel, will need labs.     Addendum (11/11/22):  Chlamydia  BV (bacterial vaginosis)        -     doxycycline (VIBRA-TABS) 100 MG tablet; Take 1 tablet (100 mg total) by mouth 2 (two) times daily. for 7 days  -     metroNIDAZOLE (FLAGYL) 500 MG tablet; Take 1 tablet (500 mg total) by mouth every 12 (twelve) hours. for 7 days    VAGINOSIS SCREEN BY DNA PROBE  Order: [id number]  Status: Final result     Visible to patient: Yes (not seen)     Next appt: None     Dx: Vaginal discharge     Specimen Information: Cervicovaginal; Genital   0 Result Notes  Component Ref Range & Units 2 d ago    Trichomonas vaginalis Negative Negative    Candida sp Negative Negative    Comment: Candida species group includes: Candida albicans, Candida tropicalis,   Candida parapsiolosis, Candida dubliniensis    Sammie glabrata DNA Negative Negative    Candida krusei DNA Negative Negative    Bacterial vaginosis DNA Negative Positive Abnormal     Resulting Agency  [facility]        C. trachomatis/N. gonorrhoeae by AMP DNA  Order: [id number]  Status: Final result     Visible to patient: Yes (seen)     Next appt: None     Dx: Possible exposure to STD     Specimen Information: Genital   1 Result Note    1  Topic  Component Ref Range & Units 2 d ago 3 mo ago 8 mo ago   Chlamydia, Amplified DNA Not Detected Detected Abnormal   Detected Abnormal   Not  Detected    N gonorrhoeae, amplified DNA Not Detected Not Detected  Not Detected  Not Detected    Resulting Agency  [facility]              Specimen Collected: 11/09/22 13:15 Last Resulted: 11/10/22 20:46                E-Visit Time Tracking:    Day 1 Time (in minutes): 10   Day 2 Time (in minutes): 10  Total Time (in minutes): 20

## 2022-11-10 ENCOUNTER — PATIENT MESSAGE (OUTPATIENT)
Dept: FAMILY MEDICINE | Facility: CLINIC | Age: 22
End: 2022-11-10
Payer: COMMERCIAL

## 2022-11-10 LAB
BACTERIA UR CULT: NO GROWTH
C TRACH DNA SPEC QL NAA+PROBE: DETECTED
N GONORRHOEA DNA SPEC QL NAA+PROBE: NOT DETECTED

## 2022-11-11 LAB
BACTERIAL VAGINOSIS DNA: POSITIVE
CANDIDA GLABRATA DNA: NEGATIVE
CANDIDA KRUSEI DNA: NEGATIVE
CANDIDA RRNA VAG QL PROBE: NEGATIVE
T VAGINALIS RRNA GENITAL QL PROBE: NEGATIVE

## 2022-11-11 RX ORDER — DOXYCYCLINE HYCLATE 100 MG
100 TABLET ORAL 2 TIMES DAILY
Qty: 14 TABLET | Refills: 0 | Status: SHIPPED | OUTPATIENT
Start: 2022-11-11 | End: 2022-11-18

## 2022-11-11 RX ORDER — METRONIDAZOLE 500 MG/1
500 TABLET ORAL EVERY 12 HOURS
Qty: 14 TABLET | Refills: 0 | Status: SHIPPED | OUTPATIENT
Start: 2022-11-11 | End: 2022-11-18

## 2022-11-14 ENCOUNTER — TELEPHONE (OUTPATIENT)
Dept: FAMILY MEDICINE | Facility: CLINIC | Age: 22
End: 2022-11-14
Payer: COMMERCIAL

## 2022-11-14 NOTE — PROGRESS NOTES
Results have been released via [employer]. Please verify that these have been viewed by patient. If not, please call patient with results.     I have sent a message to them with the following interpretation (see below).    I have reviewed your recent vaginosis screen which was positive for bacterial vaginosis. This mean that there is overgrowth of bacteria naturally found in the vagina. This is not a sexually transmitted disease. I plan to send in a medication to your pharmacy called Flagyl that should help with your symptoms.     Please do not hesitate to call or message with any additional questions or concerns.    Sailaja Watkins PA-C

## 2022-11-14 NOTE — TELEPHONE ENCOUNTER
I sent a result note for a test that was done 2 months ago and treated by Dominique Khan NP. Please let the patient know that this was an error on my part. Thanks.

## 2023-01-03 LAB — PAP RECOMMENDATION EXT: NORMAL

## 2023-01-25 ENCOUNTER — PATIENT MESSAGE (OUTPATIENT)
Dept: ADMINISTRATIVE | Facility: HOSPITAL | Age: 23
End: 2023-01-25
Payer: COMMERCIAL

## 2023-02-09 ENCOUNTER — LAB VISIT (OUTPATIENT)
Dept: LAB | Facility: HOSPITAL | Age: 23
End: 2023-02-09
Attending: INTERNAL MEDICINE
Payer: COMMERCIAL

## 2023-02-09 ENCOUNTER — PATIENT MESSAGE (OUTPATIENT)
Dept: FAMILY MEDICINE | Facility: CLINIC | Age: 23
End: 2023-02-09
Payer: COMMERCIAL

## 2023-02-09 ENCOUNTER — OFFICE VISIT (OUTPATIENT)
Dept: FAMILY MEDICINE | Facility: CLINIC | Age: 23
End: 2023-02-09
Payer: COMMERCIAL

## 2023-02-09 ENCOUNTER — PATIENT MESSAGE (OUTPATIENT)
Dept: FAMILY MEDICINE | Facility: CLINIC | Age: 23
End: 2023-02-09

## 2023-02-09 VITALS
DIASTOLIC BLOOD PRESSURE: 76 MMHG | WEIGHT: 169 LBS | HEART RATE: 81 BPM | HEIGHT: 65 IN | SYSTOLIC BLOOD PRESSURE: 130 MMHG | BODY MASS INDEX: 28.16 KG/M2 | TEMPERATURE: 98 F

## 2023-02-09 DIAGNOSIS — L02.91 ABSCESS: Primary | ICD-10-CM

## 2023-02-09 DIAGNOSIS — Z00.00 ENCOUNTER FOR ANNUAL HEALTH EXAMINATION: ICD-10-CM

## 2023-02-09 DIAGNOSIS — Z11.3 ROUTINE SCREENING FOR STI (SEXUALLY TRANSMITTED INFECTION): ICD-10-CM

## 2023-02-09 DIAGNOSIS — F41.1 GAD (GENERALIZED ANXIETY DISORDER): ICD-10-CM

## 2023-02-09 DIAGNOSIS — E55.9 VITAMIN D DEFICIENCY: ICD-10-CM

## 2023-02-09 PROBLEM — R44.3 HALLUCINATION: Status: RESOLVED | Noted: 2019-12-31 | Resolved: 2023-02-09

## 2023-02-09 PROBLEM — N89.8 VAGINAL DISCHARGE: Status: RESOLVED | Noted: 2022-08-09 | Resolved: 2023-02-09

## 2023-02-09 PROBLEM — R53.83 FATIGUE: Status: RESOLVED | Noted: 2020-11-08 | Resolved: 2023-02-09

## 2023-02-09 PROCEDURE — 99999 PR PBB SHADOW E&M-EST. PATIENT-LVL III: CPT | Mod: PBBFAC,,, | Performed by: INTERNAL MEDICINE

## 2023-02-09 PROCEDURE — 3078F DIAST BP <80 MM HG: CPT | Mod: CPTII,S$GLB,, | Performed by: INTERNAL MEDICINE

## 2023-02-09 PROCEDURE — 3008F PR BODY MASS INDEX (BMI) DOCUMENTED: ICD-10-PCS | Mod: CPTII,S$GLB,, | Performed by: INTERNAL MEDICINE

## 2023-02-09 PROCEDURE — 1160F RVW MEDS BY RX/DR IN RCRD: CPT | Mod: CPTII,S$GLB,, | Performed by: INTERNAL MEDICINE

## 2023-02-09 PROCEDURE — 3075F PR MOST RECENT SYSTOLIC BLOOD PRESS GE 130-139MM HG: ICD-10-PCS | Mod: CPTII,S$GLB,, | Performed by: INTERNAL MEDICINE

## 2023-02-09 PROCEDURE — 99395 PR PREVENTIVE VISIT,EST,18-39: ICD-10-PCS | Mod: S$GLB,,, | Performed by: INTERNAL MEDICINE

## 2023-02-09 PROCEDURE — 87591 N.GONORRHOEAE DNA AMP PROB: CPT | Performed by: INTERNAL MEDICINE

## 2023-02-09 PROCEDURE — 3075F SYST BP GE 130 - 139MM HG: CPT | Mod: CPTII,S$GLB,, | Performed by: INTERNAL MEDICINE

## 2023-02-09 PROCEDURE — 1159F PR MEDICATION LIST DOCUMENTED IN MEDICAL RECORD: ICD-10-PCS | Mod: CPTII,S$GLB,, | Performed by: INTERNAL MEDICINE

## 2023-02-09 PROCEDURE — 1159F MED LIST DOCD IN RCRD: CPT | Mod: CPTII,S$GLB,, | Performed by: INTERNAL MEDICINE

## 2023-02-09 PROCEDURE — 99395 PREV VISIT EST AGE 18-39: CPT | Mod: S$GLB,,, | Performed by: INTERNAL MEDICINE

## 2023-02-09 PROCEDURE — 99999 PR PBB SHADOW E&M-EST. PATIENT-LVL III: ICD-10-PCS | Mod: PBBFAC,,, | Performed by: INTERNAL MEDICINE

## 2023-02-09 PROCEDURE — 3078F PR MOST RECENT DIASTOLIC BLOOD PRESSURE < 80 MM HG: ICD-10-PCS | Mod: CPTII,S$GLB,, | Performed by: INTERNAL MEDICINE

## 2023-02-09 PROCEDURE — 3008F BODY MASS INDEX DOCD: CPT | Mod: CPTII,S$GLB,, | Performed by: INTERNAL MEDICINE

## 2023-02-09 PROCEDURE — 1160F PR REVIEW ALL MEDS BY PRESCRIBER/CLIN PHARMACIST DOCUMENTED: ICD-10-PCS | Mod: CPTII,S$GLB,, | Performed by: INTERNAL MEDICINE

## 2023-02-09 RX ORDER — MUPIROCIN 20 MG/G
OINTMENT TOPICAL 2 TIMES DAILY
Qty: 30 G | Refills: 1 | Status: SHIPPED | OUTPATIENT
Start: 2023-02-09 | End: 2023-02-19

## 2023-02-09 RX ORDER — SULFAMETHOXAZOLE AND TRIMETHOPRIM 800; 160 MG/1; MG/1
1 TABLET ORAL 2 TIMES DAILY
Qty: 20 TABLET | Refills: 0 | Status: SHIPPED | OUTPATIENT
Start: 2023-02-09 | End: 2023-02-16

## 2023-02-09 RX ORDER — VIT C/E/ZN/COPPR/LUTEIN/ZEAXAN 250MG-90MG
1000 CAPSULE ORAL DAILY
Qty: 90 CAPSULE | Refills: 3 | Status: ON HOLD | OUTPATIENT
Start: 2023-02-09 | End: 2023-12-29 | Stop reason: HOSPADM

## 2023-02-09 NOTE — PROGRESS NOTES
This note is specifically for wellness visit performed today.     WELLNESS EXAM      Patient ID: Conchita Luis is a 22 y.o. female.  has a past medical history of Migraine.     Chief Complaint:  Encounter for wellness exam    Well Adult Physical: Patient here for a comprehensive physical exam.     Health Maintenance Topics with due status: Not Due       Topic Last Completion Date    TETANUS VACCINE 10/14/2013    Pap Smear 01/03/2023    Chlamydia Screening 02/09/2023        ==============================================    Health Maintenance Due   Topic Date Due    COVID-19 Vaccine (3 - Booster for Pfizer series) 03/17/2022    Influenza Vaccine (1) 09/01/2022       Past Medical History:  Past Medical History:   Diagnosis Date    Migraine      History reviewed. No pertinent surgical history.  Review of patient's allergies indicates:   Allergen Reactions    Covid-19 vaccine, mrna, [id number], lnp-s (pfizer) Rash     Current Outpatient Medications on File Prior to Visit   Medication Sig Dispense Refill    JOSE RAMON 24 FE 1 mg-20 mcg (24)/75 mg (4) per tablet Take 1 tablet by mouth once daily.      valACYclovir (VALTREX) 1000 MG tablet Take 1,000 mg by mouth once daily.       No current facility-administered medications on file prior to visit.     Social History     Socioeconomic History    Marital status: Single   Tobacco Use    Smoking status: Never    Smokeless tobacco: Never   Substance and Sexual Activity    Alcohol use: Not Currently     Family History   Problem Relation Age of Onset    Diabetes Maternal Grandmother     Glaucoma Maternal Grandmother        Review of Systems   Constitutional:  Negative for chills, fever and malaise/fatigue.   HENT:  Negative for congestion and sore throat.    Eyes:  Negative for blurred vision and double vision.   Respiratory:  Negative for cough and shortness of breath.    Cardiovascular:  Negative for chest pain, palpitations and leg swelling.   Gastrointestinal:  Negative  for abdominal pain, constipation and diarrhea.   Genitourinary:  Negative for dysuria and frequency.   Musculoskeletal:  Negative for back pain and joint pain.   Neurological:  Negative for headaches.   Psychiatric/Behavioral:  Negative for depression. The patient is not nervous/anxious.          Objective:      Vitals:    02/09/23 0812   BP: 130/76   Pulse: 81   Temp: 98.2 °F (36.8 °C)     Body mass index is 28.12 kg/m².     Physical Exam  Constitutional:       General: She is not in acute distress.     Appearance: Normal appearance. She is well-developed.   HENT:      Head: Normocephalic and atraumatic.   Eyes:      Conjunctiva/sclera: Conjunctivae normal.   Cardiovascular:      Rate and Rhythm: Normal rate and regular rhythm.      Pulses: Normal pulses.      Heart sounds: Normal heart sounds. No murmur heard.  Pulmonary:      Effort: Pulmonary effort is normal. No respiratory distress.      Breath sounds: Normal breath sounds.   Abdominal:      General: Bowel sounds are normal. There is no distension.      Palpations: Abdomen is soft.      Tenderness: There is no abdominal tenderness.   Musculoskeletal:         General: Normal range of motion.      Cervical back: Normal range of motion and neck supple.   Skin:     General: Skin is warm and dry.      Findings: No rash.   Neurological:      General: No focal deficit present.      Mental Status: She is alert and oriented to person, place, and time.   Psychiatric:         Mood and Affect: Mood normal.         Behavior: Behavior normal.         All labs, imaging and procedures performed since last visit have been personally reviewed.    Assessment / Plan:      Patient here for annual wellness exam. Health maintenance was reviewed and ordered.    Complete history and physical was completed today.  Complete and thorough medication reconciliation was performed.  Discussed risks and benefits of medications.  Advised patient on orders and health maintenance. Continue  current medications listed on your summary sheet.    All questions were answered. Patient had no further concerns. Advised of diagnoses and plan. Follow up as planned or return sooner if symptoms persist or worsen.     Problem List Items Addressed This Visit          Psychiatric    KATHY (generalized anxiety disorder)       Endocrine    Vitamin D deficiency    Relevant Medications    cholecalciferol, vitamin D3, (VITAMIN D3) 25 mcg (1,000 unit) capsule     Other Visit Diagnoses       Routine screening for STI (sexually transmitted infection)        Relevant Orders    C. trachomatis/N. gonorrhoeae by AMP DNA [facility]; Urine (Completed)    Encounter for annual health examination                  Lyla Miles MD

## 2023-02-10 LAB
C TRACH DNA SPEC QL NAA+PROBE: NOT DETECTED
N GONORRHOEA DNA SPEC QL NAA+PROBE: NOT DETECTED

## 2023-02-13 NOTE — PROGRESS NOTES
Results have been released via [employer]. Please verify that these have been viewed by patient. If not, please call patient with results.    I have sent a message to them with the following interpretation (see below).    I have reviewed your recent results.    Chlamydia/gonorrhea screening is negative.    Please do not hesitate to call or message with any additional questions or concerns.    Lyla Miles MD

## 2023-02-14 ENCOUNTER — PATIENT OUTREACH (OUTPATIENT)
Dept: ADMINISTRATIVE | Facility: HOSPITAL | Age: 23
End: 2023-02-14
Payer: COMMERCIAL

## 2023-04-27 ENCOUNTER — LAB VISIT (OUTPATIENT)
Dept: LAB | Facility: HOSPITAL | Age: 23
End: 2023-04-27
Attending: PHYSICIAN ASSISTANT
Payer: COMMERCIAL

## 2023-04-27 ENCOUNTER — OFFICE VISIT (OUTPATIENT)
Dept: FAMILY MEDICINE | Facility: CLINIC | Age: 23
End: 2023-04-27
Payer: COMMERCIAL

## 2023-04-27 VITALS
DIASTOLIC BLOOD PRESSURE: 67 MMHG | WEIGHT: 169.31 LBS | HEIGHT: 65 IN | HEART RATE: 72 BPM | BODY MASS INDEX: 28.21 KG/M2 | SYSTOLIC BLOOD PRESSURE: 109 MMHG

## 2023-04-27 DIAGNOSIS — Z11.3 SCREENING EXAMINATION FOR STD (SEXUALLY TRANSMITTED DISEASE): ICD-10-CM

## 2023-04-27 DIAGNOSIS — Z11.3 SCREENING EXAMINATION FOR STD (SEXUALLY TRANSMITTED DISEASE): Primary | ICD-10-CM

## 2023-04-27 PROCEDURE — 36415 COLL VENOUS BLD VENIPUNCTURE: CPT | Mod: PO | Performed by: PHYSICIAN ASSISTANT

## 2023-04-27 PROCEDURE — 99214 PR OFFICE/OUTPT VISIT, EST, LEVL IV, 30-39 MIN: ICD-10-PCS | Mod: S$GLB,,, | Performed by: PHYSICIAN ASSISTANT

## 2023-04-27 PROCEDURE — 86592 SYPHILIS TEST NON-TREP QUAL: CPT | Performed by: PHYSICIAN ASSISTANT

## 2023-04-27 PROCEDURE — 87389 HIV-1 AG W/HIV-1&-2 AB AG IA: CPT | Performed by: PHYSICIAN ASSISTANT

## 2023-04-27 PROCEDURE — 86803 HEPATITIS C AB TEST: CPT | Performed by: PHYSICIAN ASSISTANT

## 2023-04-27 PROCEDURE — 99999 PR PBB SHADOW E&M-EST. PATIENT-LVL III: CPT | Mod: PBBFAC,,, | Performed by: PHYSICIAN ASSISTANT

## 2023-04-27 PROCEDURE — 99214 OFFICE O/P EST MOD 30 MIN: CPT | Mod: S$GLB,,, | Performed by: PHYSICIAN ASSISTANT

## 2023-04-27 PROCEDURE — 86695 HERPES SIMPLEX TYPE 1 TEST: CPT | Performed by: PHYSICIAN ASSISTANT

## 2023-04-27 PROCEDURE — 99999 PR PBB SHADOW E&M-EST. PATIENT-LVL III: ICD-10-PCS | Mod: PBBFAC,,, | Performed by: PHYSICIAN ASSISTANT

## 2023-04-27 PROCEDURE — 87591 N.GONORRHOEAE DNA AMP PROB: CPT | Performed by: PHYSICIAN ASSISTANT

## 2023-04-27 NOTE — PROGRESS NOTES
Assessment/Plan:    Problem List Items Addressed This Visit    None  Visit Diagnoses       Screening examination for STD (sexually transmitted disease)    -  Primary    Relevant Orders    C. trachomatis/N. gonorrhoeae by AMP DNA    RPR    HIV 1/2 Ag/Ab (4th Gen)    Hepatitis C Antibody    HERPES SIMPLEX 1&2 IGG            Follow up if symptoms worsen or fail to improve.    Sailaja Watkins PA-C  _____________________________________________________________________________________________________________________________________________________    CC: STD testing    HPI: Patient is in clinic today as an established patient here for sexually transmitted disease check. Sexual history reviewed with the patient. STD exposure: denies knowledge of risky exposure. Previous history of STD: chlamydia. Current symptoms include none. Contraception: OCP (estrogen/progesterone). No other complaints today.     Past Medical History:   Diagnosis Date    Migraine      History reviewed. No pertinent surgical history.  Review of patient's allergies indicates:   Allergen Reactions    Covid-19 vaccine, mrna, [id number], lnp-s (pfizer) Rash     Social History     Tobacco Use    Smoking status: Never    Smokeless tobacco: Never   Substance Use Topics    Alcohol use: Not Currently     Family History   Problem Relation Age of Onset    Diabetes Maternal Grandmother     Glaucoma Maternal Grandmother      Current Outpatient Medications on File Prior to Visit   Medication Sig Dispense Refill    cholecalciferol, vitamin D3, (VITAMIN D3) 25 mcg (1,000 unit) capsule Take 1 capsule (1,000 Units total) by mouth once daily. 90 capsule 3    JOSE RAMON 24 FE 1 mg-20 mcg (24)/75 mg (4) per tablet Take 1 tablet by mouth once daily.      valACYclovir (VALTREX) 1000 MG tablet Take 1,000 mg by mouth once daily.       No current facility-administered medications on file prior to visit.       Review of Systems   Constitutional:  Negative for chills, diaphoresis,  "fatigue and fever.   HENT:  Negative for congestion, ear pain, postnasal drip, sinus pain and sore throat.    Eyes:  Negative for pain and redness.   Respiratory:  Negative for cough, chest tightness and shortness of breath.    Cardiovascular:  Negative for chest pain and leg swelling.   Gastrointestinal:  Negative for abdominal pain, constipation, diarrhea, nausea and vomiting.   Genitourinary:  Negative for dysuria and hematuria.   Musculoskeletal:  Negative for arthralgias and joint swelling.   Skin:  Negative for rash.   Neurological:  Negative for dizziness, syncope and headaches.   Psychiatric/Behavioral:  Negative for dysphoric mood. The patient is not nervous/anxious.      Vitals:    04/27/23 1127   BP: 109/67   Pulse: 72   Weight: 76.8 kg (169 lb 4.8 oz)   Height: 5' 5" (1.651 m)       Wt Readings from Last 3 Encounters:   04/27/23 76.8 kg (169 lb 4.8 oz)   02/09/23 76.7 kg (169 lb)   08/09/22 82.9 kg (182 lb 11.2 oz)       Physical Exam  Constitutional:       General: She is not in acute distress.     Appearance: Normal appearance. She is well-developed.   HENT:      Head: Normocephalic and atraumatic.   Eyes:      Conjunctiva/sclera: Conjunctivae normal.   Cardiovascular:      Rate and Rhythm: Normal rate and regular rhythm.      Pulses: Normal pulses.      Heart sounds: Normal heart sounds. No murmur heard.  Pulmonary:      Effort: Pulmonary effort is normal. No respiratory distress.      Breath sounds: Normal breath sounds.   Abdominal:      General: Bowel sounds are normal. There is no distension.      Palpations: Abdomen is soft.      Tenderness: There is no abdominal tenderness.   Musculoskeletal:         General: Normal range of motion.      Cervical back: Normal range of motion and neck supple.   Skin:     General: Skin is warm and dry.      Findings: No rash.   Neurological:      General: No focal deficit present.      Mental Status: She is alert and oriented to person, place, and time. "   Psychiatric:         Mood and Affect: Mood normal.         Behavior: Behavior normal.       Health Maintenance   Topic Date Due    TETANUS VACCINE  10/14/2023    Chlamydia Screening  02/09/2024    Pap Smear  01/03/2026    Hepatitis C Screening  Completed    Lipid Panel  Completed    HPV Vaccines  Completed

## 2023-04-28 LAB
HCV AB SERPL QL IA: NORMAL
HIV 1+2 AB+HIV1 P24 AG SERPL QL IA: NORMAL
HSV1 IGG SERPL QL IA: POSITIVE
HSV2 IGG SERPL QL IA: NEGATIVE
RPR SER QL: NORMAL

## 2023-04-28 NOTE — PROGRESS NOTES
Results have been released via [employer]. Please verify that these have been viewed by patient. If not, please call patient with results.     I have sent a message to them with the following interpretation (see below).    I have reviewed your recent blood work.     HEPATITIS C SCREEN NORMAL-The screening for Hepatitis C was negative.  HIV SCREEN NORMAL-The screening for HIV is negative.  RPR NORMAL - The screening for syphilis is negative.   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     Please do not hesitate to call or message with any additional questions or concerns.    Sailaja Watkins PA-C

## 2023-05-01 ENCOUNTER — PATIENT MESSAGE (OUTPATIENT)
Dept: FAMILY MEDICINE | Facility: CLINIC | Age: 23
End: 2023-05-01
Payer: COMMERCIAL

## 2023-05-01 DIAGNOSIS — Z11.3 SCREENING EXAMINATION FOR STD (SEXUALLY TRANSMITTED DISEASE): Primary | ICD-10-CM

## 2023-05-01 LAB
C TRACH DNA SPEC QL NAA+PROBE: NORMAL
N GONORRHOEA DNA SPEC QL NAA+PROBE: NORMAL

## 2023-05-01 NOTE — TELEPHONE ENCOUNTER
It appears that results were invalid. I am not sure what happened. Please see if the patient would like to come back for repeat testing.     I have signed for the following orders AND/OR meds.  Please call the patient and ask the patient to schedule the testing AND/OR inform about any medications that were sent. Medications have been sent to pharmacy listed below      Orders Placed This Encounter   Procedures    C. trachomatis/N. gonorrhoeae by AMP DNA     Standing Status:   Future     Standing Expiration Date:   6/29/2024     Order Specific Question:   Source:     Answer:   Urine              [facility] - [place] - [address] [id number] [facility]  [place] [address]  Phone: [phone] Fax: [phone]

## 2023-05-03 ENCOUNTER — LAB VISIT (OUTPATIENT)
Dept: LAB | Facility: HOSPITAL | Age: 23
End: 2023-05-03
Attending: PHYSICIAN ASSISTANT
Payer: COMMERCIAL

## 2023-05-03 DIAGNOSIS — Z11.3 SCREENING EXAMINATION FOR STD (SEXUALLY TRANSMITTED DISEASE): ICD-10-CM

## 2023-05-03 PROCEDURE — 87591 N.GONORRHOEAE DNA AMP PROB: CPT | Performed by: PHYSICIAN ASSISTANT

## 2023-05-04 LAB
C TRACH DNA SPEC QL NAA+PROBE: NOT DETECTED
N GONORRHOEA DNA SPEC QL NAA+PROBE: NOT DETECTED

## 2023-05-05 NOTE — PROGRESS NOTES
Results have been released via [employer]. Please verify that these have been viewed by patient. If not, please call patient with results.     I have sent a message to them with the following interpretation (see below).    I have reviewed your recent results. STD testing for chlamydia and gonorrhea was negative.      Please do not hesitate to call or message with any additional questions or concerns.    Sailaja Watkins PA-C

## 2023-08-15 ENCOUNTER — PATIENT MESSAGE (OUTPATIENT)
Dept: FAMILY MEDICINE | Facility: CLINIC | Age: 23
End: 2023-08-15
Payer: COMMERCIAL

## 2023-08-15 ENCOUNTER — TELEPHONE (OUTPATIENT)
Dept: PSYCHIATRY | Facility: CLINIC | Age: 23
End: 2023-08-15
Payer: COMMERCIAL

## 2023-08-15 NOTE — TELEPHONE ENCOUNTER
----- Message from Danna Cardona sent at 8/15/2023  1:26 PM CDT -----  Pt is requesting to be scheduled,Please call back at [phone].thanks

## 2023-10-16 ENCOUNTER — TELEPHONE (OUTPATIENT)
Dept: OBSTETRICS AND GYNECOLOGY | Facility: CLINIC | Age: 23
End: 2023-10-16
Payer: COMMERCIAL

## 2023-10-16 NOTE — TELEPHONE ENCOUNTER
----- Message from Niesha Cantu sent at 10/13/2023 10:47 AM CDT -----  Contact: ALFONSO RDZ [[id number]]  ..Type:  Patient Requesting Call    Who Called: ALFONSO RDZ [[id number]]  Does the patient know what this is regarding?: Does [facility] offer prenatal classes  Would the patient rather a call back or a response via [facility]?  Call  Best Call Back Number: .[phone]

## 2023-12-28 PROBLEM — Z34.90: Status: ACTIVE | Noted: 2023-12-28

## 2023-12-28 PROBLEM — E87.6 HYPOKALEMIA: Status: ACTIVE | Noted: 2023-12-28

## 2023-12-29 ENCOUNTER — TELEPHONE (OUTPATIENT)
Dept: MATERNAL FETAL MEDICINE | Facility: CLINIC | Age: 23
End: 2023-12-29
Payer: COMMERCIAL

## 2023-12-29 ENCOUNTER — PATIENT MESSAGE (OUTPATIENT)
Dept: MATERNAL FETAL MEDICINE | Facility: CLINIC | Age: 23
End: 2023-12-29
Payer: COMMERCIAL

## 2023-12-29 DIAGNOSIS — O14.93 PRE-ECLAMPSIA IN THIRD TRIMESTER: ICD-10-CM

## 2023-12-29 DIAGNOSIS — O14.93 PRE-ECLAMPSIA IN THIRD TRIMESTER: Primary | ICD-10-CM

## 2023-12-29 DIAGNOSIS — Z36.89 ENCOUNTER FOR ULTRASOUND TO ASSESS FETAL GROWTH: Primary | ICD-10-CM

## 2023-12-29 PROBLEM — Z30.8 ENCOUNTER FOR OTHER CONTRACEPTIVE MANAGEMENT: Status: RESOLVED | Noted: 2019-12-31 | Resolved: 2023-12-29

## 2023-12-29 PROBLEM — N91.2 AMENORRHEA: Status: RESOLVED | Noted: 2022-08-09 | Resolved: 2023-12-29

## 2023-12-29 PROBLEM — Z34.90: Status: RESOLVED | Noted: 2023-12-28 | Resolved: 2023-12-29

## 2023-12-29 NOTE — TELEPHONE ENCOUNTER
Phone number listed is not working.  Portal message sent.    ----- Message from Betty Estrada RN sent at 12/29/2023  1:04 PM CST -----  Regarding: appt       WOuld yall have anything for this patient in the next couple weeks? She has proteinuria and is admitted [facility] (24 hr obs) for Pre E eval.    Thank you!!  Betty

## 2024-01-08 ENCOUNTER — PATIENT MESSAGE (OUTPATIENT)
Dept: MATERNAL FETAL MEDICINE | Facility: CLINIC | Age: 24
End: 2024-01-08

## 2024-01-16 ENCOUNTER — TELEPHONE (OUTPATIENT)
Dept: MATERNAL FETAL MEDICINE | Facility: CLINIC | Age: 24
End: 2024-01-16
Payer: MEDICAID

## 2024-01-16 NOTE — TELEPHONE ENCOUNTER
Called patient back and scheduled her.    ----- Message from Gordo Hogue MA sent at 1/16/2024  1:37 PM CST -----  Patient called regards to rescheduling her new cons appointment, she mentioned she have a new phone number .    Patient contact: [phone]

## 2024-01-17 ENCOUNTER — OFFICE VISIT (OUTPATIENT)
Dept: MATERNAL FETAL MEDICINE | Facility: CLINIC | Age: 24
End: 2024-01-17
Payer: MEDICAID

## 2024-01-17 ENCOUNTER — PROCEDURE VISIT (OUTPATIENT)
Dept: MATERNAL FETAL MEDICINE | Facility: CLINIC | Age: 24
End: 2024-01-17
Payer: MEDICAID

## 2024-01-17 VITALS
HEIGHT: 65 IN | DIASTOLIC BLOOD PRESSURE: 69 MMHG | BODY MASS INDEX: 32.18 KG/M2 | HEART RATE: 99 BPM | SYSTOLIC BLOOD PRESSURE: 122 MMHG | WEIGHT: 193.13 LBS

## 2024-01-17 DIAGNOSIS — O16.3 ELEVATED BLOOD PRESSURE AFFECTING PREGNANCY IN THIRD TRIMESTER, ANTEPARTUM: ICD-10-CM

## 2024-01-17 DIAGNOSIS — O14.93 PRE-ECLAMPSIA IN THIRD TRIMESTER: ICD-10-CM

## 2024-01-17 DIAGNOSIS — O14.93 PRE-ECLAMPSIA IN THIRD TRIMESTER: Primary | ICD-10-CM

## 2024-01-17 PROCEDURE — 99999 PR PBB SHADOW E&M-EST. PATIENT-LVL I: CPT | Mod: PBBFAC,,, | Performed by: STUDENT IN AN ORGANIZED HEALTH CARE EDUCATION/TRAINING PROGRAM

## 2024-01-17 PROCEDURE — 76811 OB US DETAILED SNGL FETUS: CPT | Mod: 26,S$PBB,, | Performed by: STUDENT IN AN ORGANIZED HEALTH CARE EDUCATION/TRAINING PROGRAM

## 2024-01-17 PROCEDURE — 99211 OFF/OP EST MAY X REQ PHY/QHP: CPT | Mod: PBBFAC,TH,PN | Performed by: STUDENT IN AN ORGANIZED HEALTH CARE EDUCATION/TRAINING PROGRAM

## 2024-01-17 PROCEDURE — 76819 FETAL BIOPHYS PROFIL W/O NST: CPT | Mod: PBBFAC,PN | Performed by: STUDENT IN AN ORGANIZED HEALTH CARE EDUCATION/TRAINING PROGRAM

## 2024-01-17 PROCEDURE — 99204 OFFICE O/P NEW MOD 45 MIN: CPT | Mod: S$PBB,TH,, | Performed by: STUDENT IN AN ORGANIZED HEALTH CARE EDUCATION/TRAINING PROGRAM

## 2024-01-17 NOTE — PROGRESS NOTES
"Pt states + fetal movement, denies leaking for vaginal bleeding. Pt c/o "heartburn", denies headaches, seeing spots, dizziness or fatigue.  LACY Diego with Dr Amaral states pt's BP was elevated in at OB visit with range of 130s-140s / 90s. Protein was found to be 319 at that time as well.   VS  WNL today.    "

## 2024-01-17 NOTE — ASSESSMENT & PLAN NOTE
Patient presents as a consult due to concern for preeclampsia without severe features. She presented to Dr. Amaral's clinic with blood pressures in 130s-140s systolic and 80-90s diastolic. Her 24 hr urine protein was 319 mg/24 hrs on 12/22. Patient was sent to [facility] for further preeclampsia workup. At [facility], she was found to be hypokalemic( s/p repletion) with a mildly elevated AST at 41  All other labs upon my review are negative for preeclampsia and patient is asymptomatic.  Upon review of hospital records, no further mild range blood pressures noted; however, if patient does continue to have blood pressure >= 140 / 90 mmHg on an additional separate occasion, she would meet the criteria for Preeclampsia without severe features.  Preeclampsia without severe features   Although she does not yet meet the criteria for preeclampsia without severe features, I discussed the diagnosis with the patient.     Other causes of elevated blood pressure and proteinuria include chronic renal disease, autoimmune disorders, chronic hypertension, and diabetes.  We reviewed the potential course of this disease, maternal-fetal complications, the lack of any available therapy to prevent progression, the likelihood of resolution post-delivery, and the potential for recurrence in a subsequent pregnancy.   I counseled the patient that one of the major fetal considerations in a pregnancy complicated by preeclampsia are those that are related to prematurity. While delivery for preeclampsia without severe features should be undertaken at 37 weeks gestation, should severe features develop and the pregnancy is beyond 34 weeks of gestation, delivery should be undertaken immediately.      I discussed with the patient that progression to preeclampsia with severe features can occur at any point and therefore it is necessary to maintain close surveillance of maternal blood pressures, fetal well-being, maternal laboratory studies, and  maternal symptoms.     We discussed both models of care for monitoring a pregnancy that is complicated by preeclampsia without severe features. When severe features are not present, some physicians and patients prefer to closely monitor pregnancies in the outpatient setting.     If this is chosen after a period of close observation in the hospital, my recommendations for how to perform this are listed below. Certainly, if at any point, features of severe disease present, the patient should immediately present to the hospital for care.     Recommendations:  Given the patient's advanced gestational age, with single mild elevations in clinic and proteinuria,   Recommend:  Weekly  surveillance either with BPP or NST with MVP  Close blood pressure monitoring with weekly clinic visits  Recommend a repeat CBC, CMP next week to assess for further progression  We have completed a growth ultrasound today. The fetus is appropriately grown with no concern for fetal growth restriction or oligohydramnios. Either of these findings would adjust the delivery timing  If the patient continues to have mild elevations, delivery recommended at 37 weeks  If the patient continues to have normal range blood pressures, full term delivery per OB is appropriate.  If patient develops symptoms or laboratory evidence of severe disease presents (eclampsia, pulmonary edema, uncontrollable hypertension, laboratory evidence of DIC or HELLP syndrome, placental abruption, progressive renal failure or severe thrombocytopenia) or evidence of fetal compromise with non-reassuring status develops, delivery should occur.

## 2024-01-17 NOTE — PROGRESS NOTES
MATERNAL-FETAL MEDICINE   CONSULT NOTE    Provider requesting consultation: Man Amaral MD    SUBJECTIVE:     Ms. Conchita Luis is a 23 y.o.  female with IUP at 34w6d who is seen in consultation by MFM for evaluation and management of:  Problem   Elevated Blood Pressure Affecting Pregnancy in Third Trimester, Antepartum             Medication List with Changes/Refills   Current Medications    DICLEGIS 10-10 MG TBEC    Take 2 tablets by mouth 2 (two) times a day.    PRENATAL VITAMIN PLUS LOW IRON 27 MG IRON- 1 MG TAB    Take 1 tablet by mouth once daily.    VALACYCLOVIR (VALTREX) 1000 MG TABLET    Take 1,000 mg by mouth once daily.       Review of patient's allergies indicates:   Allergen Reactions    Covid-19 vaccine, mrna, [id number], lnp-s (pfizer) Rash       OB History    Para Term  AB Living   1 0 0 0 0 0   SAB IAB Ectopic Multiple Live Births   0 0 0 0 0      # Outcome Date GA Lbr Allan/2nd Weight Sex Delivery Anes PTL Lv   1 Current              Past Medical History:   Diagnosis Date    Migraine      Past Surgical History:   Procedure Laterality Date    TONSILLECTOMY       Family history: negative for birth defects, recurrent miscarriages, chromosomal abnormalities.   Social History     Tobacco Use    Smoking status: Never    Smokeless tobacco: Never   Substance Use Topics    Alcohol use: Not Currently     Review of patient's allergies indicates:   Allergen Reactions    Covid-19 vaccine, mrna, [id number], lnp-s (pfizer) Rash         Objective:   LMP 2023 (Exact Date)     Ultrasound performed. See viewpoint for full ultrasound report.  A detailed fetal anatomic ultrasound examination was performed for the following high risk indication: Preeclampsia without severe features.   No fetal structural malformations are identified; however, fetal imaging is incomplete today.   Given advanced gestational age, no further anatomical studies will be scheduled  Fetal size today is consistent with  established gestational age.   Placental location is right lateral, posterior without evidence of previa.   BPP 8/8    Significant labs/imagin mg/24 hrs Urine Protein on   K 2.9 with repletion to 3.7 inpatient  AST at 41 u/l  All other labs reviewed from Dr. Amaral's office as well as from [facility]- scanned into media and in Epic    ASSESSMENT/PLAN:     23 y.o.  female with IUP at 34w6d     Elevated blood pressure affecting pregnancy in third trimester, antepartum  Patient presents as a consult due to concern for preeclampsia without severe features. She presented to Dr. Amaral's clinic with blood pressures in 130s-140s systolic and 80-90s diastolic. Her 24 hr urine protein was 319 mg/24 hrs on . Patient was sent to [facility] for further preeclampsia workup. At [facility], she was found to be hypokalemic( s/p repletion) with a mildly elevated AST at 41  All other labs upon my review are negative for preeclampsia and patient is asymptomatic.  Upon review of hospital records, no further mild range blood pressures noted; however, if patient does continue to have blood pressure >= 140 / 90 mmHg on an additional separate occasion, she would meet the criteria for Preeclampsia without severe features.  Preeclampsia without severe features   Although she does not yet meet the criteria for preeclampsia without severe features, I discussed the diagnosis with the patient.     Other causes of elevated blood pressure and proteinuria include chronic renal disease, autoimmune disorders, chronic hypertension, and diabetes.  We reviewed the potential course of this disease, maternal-fetal complications, the lack of any available therapy to prevent progression, the likelihood of resolution post-delivery, and the potential for recurrence in a subsequent pregnancy.   I counseled the patient that one of the major fetal considerations in a pregnancy complicated by preeclampsia are those that are related to  prematurity. While delivery for preeclampsia without severe features should be undertaken at 37 weeks gestation, should severe features develop and the pregnancy is beyond 34 weeks of gestation, delivery should be undertaken immediately.      I discussed with the patient that progression to preeclampsia with severe features can occur at any point and therefore it is necessary to maintain close surveillance of maternal blood pressures, fetal well-being, maternal laboratory studies, and maternal symptoms.     We discussed both models of care for monitoring a pregnancy that is complicated by preeclampsia without severe features. When severe features are not present, some physicians and patients prefer to closely monitor pregnancies in the outpatient setting.     If this is chosen after a period of close observation in the hospital, my recommendations for how to perform this are listed below. Certainly, if at any point, features of severe disease present, the patient should immediately present to the hospital for care.     Recommendations:  Given the patient's advanced gestational age, with single mild elevations in clinic and proteinuria,   Recommend:  Weekly  surveillance either with BPP or NST with MVP  Close blood pressure monitoring with weekly clinic visits  Recommend a repeat CBC, CMP next week to assess for further progression  We have completed a growth ultrasound today. The fetus is appropriately grown with no concern for fetal growth restriction or oligohydramnios. Either of these findings would adjust the delivery timing  If the patient continues to have mild elevations, delivery recommended at 37 weeks  If the patient continues to have normal range blood pressures, full term delivery per OB is appropriate.  If patient develops symptoms or laboratory evidence of severe disease presents (eclampsia, pulmonary edema, uncontrollable hypertension, laboratory evidence of DIC or HELLP syndrome, placental  abruption, progressive renal failure or severe thrombocytopenia) or evidence of fetal compromise with non-reassuring status develops, delivery should occur.     FOLLOW UP:   No further ultrasounds or visits were scheduled    40 minutes of total time spent on the encounter, which includes face to face time and non-face to face time preparing to see the patient (eg, review of tests), obtaining and/or reviewing separately obtained history, documenting clinical information in the electronic or other health record, independently interpreting results (not separately reported) and communicating results to the patient/family/caregiver, or care coordination (not separately reported).      Derek Valenzuela  Maternal-Fetal Medicine    Electronically Signed by Derek Valenzuela January 17, 2024

## 2024-01-17 NOTE — ASSESSMENT & PLAN NOTE
Patient presents as a consult due to concern for preeclampsia without severe features. She presented to Dr. Amaral's clinic with blood pressures in 130s-140s systolic and 80-90s diastolic. Her 24 hr urine protein was 319 mg/24 hrs on 12/22. Patient was sent to [facility] for further preeclampsia workup. At [facility], she was found to be hypokalemic( s/p repletion) with a mildly elevated AST at 41  All other labs upon my review are negative for preeclampsia and patient is asymptomatic.  Upon review of hospital records, no further mild range blood pressures noted; however, if patient does continue to have blood pressure >= 140 / 90 mmHg on an additional separate occasion, she would meet the criteria for Preeclampsia without severe features.  Preeclampsia without severe features   Although she does not yet meet the criteria for preeclampsia without severe features, I discussed the diagnosis with the patient.     Other causes of elevated blood pressure and proteinuria include chronic renal disease, autoimmune disorders, chronic hypertension, and diabetes.  We reviewed the potential course of this disease, maternal-fetal complications, the lack of any available therapy to prevent progression, the likelihood of resolution post-delivery, and the potential for recurrence in a subsequent pregnancy.   I counseled the patient that one of the major fetal considerations in a pregnancy complicated by preeclampsia are those that are related to prematurity. While delivery for preeclampsia without severe features should be undertaken at 37 weeks gestation, should severe features develop and the pregnancy is beyond 34 weeks of gestation, delivery should be undertaken immediately.      I discussed with the patient that progression to preeclampsia with severe features can occur at any point and therefore it is necessary to maintain close surveillance of maternal blood pressures, fetal well-being, maternal laboratory studies, and  maternal symptoms.     We discussed both models of care for monitoring a pregnancy that is complicated by preeclampsia without severe features. When severe features are not present, some physicians and patients prefer to closely monitor pregnancies in the outpatient setting.     If this is chosen after a period of close observation in the hospital, my recommendations for how to perform this are listed below. Certainly, if at any point, features of severe disease present, the patient should immediately present to the hospital for care.     Recommendations:  Given the patient's advanced gestational age, with single mild elevations in clinic and proteinuria,   Recommend:  Weekly  surveillance either with BPP or NST with MVP  Close blood pressure monitoring with weekly clinic visits  Recommend a repeat CBC, CMP next week to assess for further progression  We have completed a growth ultrasound today. The fetus is appropriately grown with no concern for fetal growth restriction or oligohydramnios. Either of these findings would adjust the delivery timing  If the patient continues to have mild elevations, delivery recommended at 37 weeks  If the patient continues to have normal range blood pressures, full term delivery per OB is appropriate.  If patient develops symptoms or laboratory evidence of severe disease presents (eclampsia, pulmonary edema, uncontrollable hypertension, laboratory evidence of DIC or HELLP syndrome, placental abruption, progressive renal failure or severe thrombocytopenia) or evidence of fetal compromise with non-reassuring status develops, delivery should occur.

## 2024-02-27 ENCOUNTER — PATIENT MESSAGE (OUTPATIENT)
Dept: MATERNAL FETAL MEDICINE | Facility: CLINIC | Age: 24
End: 2024-02-27
Payer: MEDICAID

## 2024-04-24 ENCOUNTER — OFFICE VISIT (OUTPATIENT)
Dept: FAMILY MEDICINE | Facility: CLINIC | Age: 24
End: 2024-04-24
Payer: MEDICAID

## 2024-04-24 VITALS
HEIGHT: 65 IN | SYSTOLIC BLOOD PRESSURE: 117 MMHG | RESPIRATION RATE: 18 BRPM | DIASTOLIC BLOOD PRESSURE: 76 MMHG | OXYGEN SATURATION: 99 % | WEIGHT: 179.38 LBS | HEART RATE: 86 BPM | BODY MASS INDEX: 29.89 KG/M2

## 2024-04-24 DIAGNOSIS — F32.0 CURRENT MILD EPISODE OF MAJOR DEPRESSIVE DISORDER WITHOUT PRIOR EPISODE: Primary | ICD-10-CM

## 2024-04-24 PROCEDURE — 3074F SYST BP LT 130 MM HG: CPT | Mod: CPTII,,, | Performed by: PHYSICIAN ASSISTANT

## 2024-04-24 PROCEDURE — 99999 PR PBB SHADOW E&M-EST. PATIENT-LVL IV: CPT | Mod: PBBFAC,,, | Performed by: PHYSICIAN ASSISTANT

## 2024-04-24 PROCEDURE — 3078F DIAST BP <80 MM HG: CPT | Mod: CPTII,,, | Performed by: PHYSICIAN ASSISTANT

## 2024-04-24 PROCEDURE — 3008F BODY MASS INDEX DOCD: CPT | Mod: CPTII,,, | Performed by: PHYSICIAN ASSISTANT

## 2024-04-24 PROCEDURE — 99214 OFFICE O/P EST MOD 30 MIN: CPT | Mod: S$PBB,,, | Performed by: PHYSICIAN ASSISTANT

## 2024-04-24 PROCEDURE — 99214 OFFICE O/P EST MOD 30 MIN: CPT | Mod: PBBFAC,PO | Performed by: PHYSICIAN ASSISTANT

## 2024-04-24 PROCEDURE — 1160F RVW MEDS BY RX/DR IN RCRD: CPT | Mod: CPTII,,, | Performed by: PHYSICIAN ASSISTANT

## 2024-04-24 PROCEDURE — 1159F MED LIST DOCD IN RCRD: CPT | Mod: CPTII,,, | Performed by: PHYSICIAN ASSISTANT

## 2024-04-24 RX ORDER — SERTRALINE HYDROCHLORIDE 25 MG/1
25 TABLET, FILM COATED ORAL DAILY
Qty: 30 TABLET | Refills: 2 | Status: SHIPPED | OUTPATIENT
Start: 2024-04-24 | End: 2024-07-23

## 2024-04-24 NOTE — PROGRESS NOTES
Assessment/Plan:    Problem List Items Addressed This Visit          Psychiatric    Current mild episode of major depressive disorder without prior episode - Primary    Overview     -worsening since having her baby in 2/2024  -plan to start Zoloft 25 mg QD  -discussed SSRI/SNRI as first-line treatment for this condition  -discussed risk of discontinuing this medication without tapering  -patient was educated, advised of side effects, and all questions were answered. Patient voiced understanding  -patient will follow up routinely and notify us if having any side effects or worsening or persistent symptoms. ER precautions were given.         Relevant Medications    sertraline (ZOLOFT) 25 MG tablet       Follow up in about 1 month (around 5/24/2024), or if symptoms worsen or fail to improve.    Sailaja Watkins PA-C  _____________________________________________________________________________________________________________________________________________________    CC: depression    HPI: Patient is in clinic today as an established patient here for depression. She complains of anhedonia, depressed mood, loss of appetite and fatigue. Onset was approximately 3 months ago after having her baby in 2/2024. She denies current suicidal and homicidal plan or intent. She is not currently on medication for anxiety or depression and has never been on medication for this. She is interested in starting medication to help with her symptoms. No other complaints today.      Past Medical History:   Diagnosis Date    Herpes simplex virus (HSV) infection     Type I    Hypertension     gestational    Migraine      Past Surgical History:   Procedure Laterality Date    TONSILLECTOMY  2018    WISDOM TOOTH EXTRACTION Bilateral 2019     Review of patient's allergies indicates:   Allergen Reactions    Covid-19 vaccine, mrna, [id number], lnp-s (pfizer) Rash     Social History     Tobacco Use    Smoking status: Former     Types: Vaping with  "nicotine     Quit date: 2022     Years since quittin.7    Smokeless tobacco: Never   Substance Use Topics    Alcohol use: Not Currently    Drug use: Never     Family History   Problem Relation Name Age of Onset    Thyroid nodules Mother      Diabetes Father      Diabetes Maternal Grandmother      Glaucoma Maternal Grandmother       Current Outpatient Medications on File Prior to Visit   Medication Sig Dispense Refill    PRENATAL VITAMIN PLUS LOW IRON 27 mg iron- 1 mg Tab Take 1 tablet by mouth once daily.      [DISCONTINUED] ibuprofen (ADVIL,MOTRIN) 600 MG tablet Take 1 tablet (600 mg total) by mouth every 6 (six) hours as needed for Pain. 30 tablet 0    [DISCONTINUED] oxyCODONE-acetaminophen (PERCOCET) 5-325 mg per tablet Take 1 tablet by mouth every 4 (four) hours as needed for Pain. 10 tablet 0     No current facility-administered medications on file prior to visit.       Review of Systems   Constitutional:  Negative for chills, diaphoresis, fatigue and fever.   HENT:  Negative for congestion, ear pain, postnasal drip, sinus pain and sore throat.    Eyes:  Negative for pain and redness.   Respiratory:  Negative for cough, chest tightness and shortness of breath.    Cardiovascular:  Negative for chest pain and leg swelling.   Gastrointestinal:  Negative for abdominal pain, constipation, diarrhea, nausea and vomiting.   Genitourinary:  Negative for dysuria and hematuria.   Musculoskeletal:  Negative for arthralgias and joint swelling.   Skin:  Negative for rash.   Neurological:  Negative for dizziness, syncope and headaches.   Psychiatric/Behavioral:  Positive for dysphoric mood. The patient is not nervous/anxious.        Vitals:    24 1442   BP: 117/76   Pulse: 86   Resp: 18   SpO2: 99%   Weight: 81.4 kg (179 lb 6.4 oz)   Height: 5' 5" (1.651 m)       Wt Readings from Last 3 Encounters:   24 81.4 kg (179 lb 6.4 oz)   24 87.6 kg (193 lb 2 oz)   23 85.2 kg (187 lb 13.3 oz) "       Physical Exam  Constitutional:       General: She is not in acute distress.     Appearance: Normal appearance. She is well-developed.   HENT:      Head: Normocephalic and atraumatic.   Eyes:      Conjunctiva/sclera: Conjunctivae normal.   Cardiovascular:      Rate and Rhythm: Normal rate and regular rhythm.      Pulses: Normal pulses.      Heart sounds: Normal heart sounds. No murmur heard.  Pulmonary:      Effort: Pulmonary effort is normal. No respiratory distress.      Breath sounds: Normal breath sounds.   Abdominal:      General: Bowel sounds are normal. There is no distension.      Palpations: Abdomen is soft.      Tenderness: There is no abdominal tenderness.   Musculoskeletal:         General: Normal range of motion.      Cervical back: Normal range of motion and neck supple.   Skin:     General: Skin is warm and dry.      Findings: No rash.   Neurological:      General: No focal deficit present.      Mental Status: She is alert and oriented to person, place, and time.   Psychiatric:         Mood and Affect: Mood normal.         Behavior: Behavior normal.         Thought Content: Thought content normal.         Judgment: Judgment normal.         Health Maintenance   Topic Date Due    Chlamydia Screening  07/27/2024    Pap Smear  01/03/2026    TETANUS VACCINE  02/08/2034    Hepatitis C Screening  Completed    Lipid Panel  Completed    HPV Vaccines  Completed

## 2024-05-13 PROBLEM — O14.93 PRE-ECLAMPSIA IN THIRD TRIMESTER: Status: RESOLVED | Noted: 2023-12-29 | Resolved: 2024-05-13

## 2024-07-15 ENCOUNTER — TELEPHONE (OUTPATIENT)
Dept: FAMILY MEDICINE | Facility: CLINIC | Age: 24
End: 2024-07-15
Payer: MEDICAID

## 2024-07-15 NOTE — TELEPHONE ENCOUNTER
----- Message from Leif Tellez sent at 7/15/2024 11:15 AM CDT -----  Contact: Conchita Arango is requesting a call back to get a same day virtual appointment scheduled with Dr. Miles or Sailaja hager to discuss her post partum depression medication. Please give her a call at [phone]

## 2024-07-22 ENCOUNTER — TELEPHONE (OUTPATIENT)
Dept: FAMILY MEDICINE | Facility: CLINIC | Age: 24
End: 2024-07-22
Payer: MEDICAID

## 2024-07-22 ENCOUNTER — OFFICE VISIT (OUTPATIENT)
Dept: FAMILY MEDICINE | Facility: CLINIC | Age: 24
End: 2024-07-22
Payer: MEDICAID

## 2024-07-22 DIAGNOSIS — F32.A DEPRESSION, UNSPECIFIED DEPRESSION TYPE: Primary | ICD-10-CM

## 2024-07-22 PROCEDURE — 99213 OFFICE O/P EST LOW 20 MIN: CPT | Mod: 95,,, | Performed by: FAMILY MEDICINE

## 2024-07-22 PROCEDURE — 1159F MED LIST DOCD IN RCRD: CPT | Mod: CPTII,95,, | Performed by: FAMILY MEDICINE

## 2024-07-22 PROCEDURE — 1160F RVW MEDS BY RX/DR IN RCRD: CPT | Mod: CPTII,95,, | Performed by: FAMILY MEDICINE

## 2024-07-22 RX ORDER — PAROXETINE HYDROCHLORIDE 20 MG/1
20 TABLET, FILM COATED ORAL EVERY MORNING
Qty: 90 TABLET | Refills: 1 | Status: SHIPPED | OUTPATIENT
Start: 2024-07-22 | End: 2025-01-18
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
## 2024-07-22 NOTE — TELEPHONE ENCOUNTER
Pt states she is having post partum depression and is wanting to speak to someone about her medications and possibly changing her medications.  Pt requested same day appt.  Virtual visit scheduled per pt request.

## 2024-07-22 NOTE — TELEPHONE ENCOUNTER
----- Message from Kerline Armas sent at 7/22/2024  9:06 AM CDT -----  Contact: Conchita  Patient is calling and need a callback from office concerning medication and a virtual appointment. Please callback [phone]

## 2024-08-26 ENCOUNTER — TELEPHONE (OUTPATIENT)
Dept: FAMILY MEDICINE | Facility: CLINIC | Age: 24
End: 2024-08-26
Payer: MEDICAID

## 2024-08-26 NOTE — TELEPHONE ENCOUNTER
----- Message from Nell Franklin sent at 8/26/2024 12:31 PM CDT -----  Regarding: appt access/Requesting Orders  Contact: [phone]  Pt calling to request order to be put in for TB test. Pt states the appt can just be scheduled.

## 2024-11-12 ENCOUNTER — TELEPHONE (OUTPATIENT)
Dept: FAMILY MEDICINE | Facility: CLINIC | Age: 24
End: 2024-11-12
Payer: MEDICAID

## 2024-11-12 NOTE — TELEPHONE ENCOUNTER
----- Message from Karla sent at 11/12/2024 10:58 AM CST -----  .Name of Caller:.Conchita Luis   When is the first available appointment?  Symptoms:wellness  Best Call Back Number:..[phone]    Additional Information: Patient would like to schedule a wellness appointment.

## 2024-11-13 ENCOUNTER — LAB VISIT (OUTPATIENT)
Dept: LAB | Facility: HOSPITAL | Age: 24
End: 2024-11-13
Attending: NURSE PRACTITIONER
Payer: MEDICAID

## 2024-11-13 ENCOUNTER — OFFICE VISIT (OUTPATIENT)
Dept: FAMILY MEDICINE | Facility: CLINIC | Age: 24
End: 2024-11-13
Payer: MEDICAID

## 2024-11-13 VITALS
BODY MASS INDEX: 30.88 KG/M2 | HEART RATE: 94 BPM | OXYGEN SATURATION: 99 % | TEMPERATURE: 99 F | HEIGHT: 65 IN | SYSTOLIC BLOOD PRESSURE: 110 MMHG | WEIGHT: 185.31 LBS | DIASTOLIC BLOOD PRESSURE: 75 MMHG

## 2024-11-13 DIAGNOSIS — F32.A DEPRESSION, UNSPECIFIED DEPRESSION TYPE: ICD-10-CM

## 2024-11-13 DIAGNOSIS — Z11.3 SCREEN FOR STD (SEXUALLY TRANSMITTED DISEASE): ICD-10-CM

## 2024-11-13 DIAGNOSIS — Z13.31 POSITIVE DEPRESSION SCREENING: Primary | ICD-10-CM

## 2024-11-13 LAB
HAV IGM SERPL QL IA: NORMAL
HBV CORE IGM SERPL QL IA: NORMAL
HBV SURFACE AG SERPL QL IA: NORMAL
HCV AB SERPL QL IA: NORMAL
HIV 1+2 AB+HIV1 P24 AG SERPL QL IA: NORMAL
TREPONEMA PALLIDUM IGG+IGM AB [PRESENCE] IN SERUM OR PLASMA BY IMMUNOASSAY: NONREACTIVE

## 2024-11-13 PROCEDURE — 3074F SYST BP LT 130 MM HG: CPT | Mod: CPTII,,, | Performed by: NURSE PRACTITIONER

## 2024-11-13 PROCEDURE — 86696 HERPES SIMPLEX TYPE 2 TEST: CPT | Performed by: NURSE PRACTITIONER

## 2024-11-13 PROCEDURE — 0352U VAGINOSIS SCREEN BY DNA PROBE: CPT | Performed by: NURSE PRACTITIONER

## 2024-11-13 PROCEDURE — 99999 PR PBB SHADOW E&M-EST. PATIENT-LVL IV: CPT | Mod: PBBFAC,,, | Performed by: NURSE PRACTITIONER

## 2024-11-13 PROCEDURE — 86593 SYPHILIS TEST NON-TREP QUANT: CPT | Performed by: NURSE PRACTITIONER

## 2024-11-13 PROCEDURE — 87389 HIV-1 AG W/HIV-1&-2 AB AG IA: CPT | Performed by: NURSE PRACTITIONER

## 2024-11-13 PROCEDURE — 87591 N.GONORRHOEAE DNA AMP PROB: CPT | Performed by: NURSE PRACTITIONER

## 2024-11-13 PROCEDURE — 99214 OFFICE O/P EST MOD 30 MIN: CPT | Mod: PBBFAC,PO | Performed by: NURSE PRACTITIONER

## 2024-11-13 PROCEDURE — 36415 COLL VENOUS BLD VENIPUNCTURE: CPT | Mod: PO | Performed by: NURSE PRACTITIONER

## 2024-11-13 PROCEDURE — 3008F BODY MASS INDEX DOCD: CPT | Mod: CPTII,,, | Performed by: NURSE PRACTITIONER

## 2024-11-13 PROCEDURE — 80074 ACUTE HEPATITIS PANEL: CPT | Performed by: NURSE PRACTITIONER

## 2024-11-13 PROCEDURE — 1159F MED LIST DOCD IN RCRD: CPT | Mod: CPTII,,, | Performed by: NURSE PRACTITIONER

## 2024-11-13 PROCEDURE — 3078F DIAST BP <80 MM HG: CPT | Mod: CPTII,,, | Performed by: NURSE PRACTITIONER

## 2024-11-13 PROCEDURE — 99213 OFFICE O/P EST LOW 20 MIN: CPT | Mod: S$PBB,,, | Performed by: NURSE PRACTITIONER

## 2024-11-13 PROCEDURE — 1160F RVW MEDS BY RX/DR IN RCRD: CPT | Mod: CPTII,,, | Performed by: NURSE PRACTITIONER

## 2024-11-13 RX ORDER — LORAZEPAM 1 MG/1
1 TABLET ORAL
COMMUNITY
Start: 2024-11-08

## 2024-11-13 RX ORDER — ESCITALOPRAM OXALATE 10 MG/1
10 TABLET ORAL DAILY
Qty: 90 TABLET | Refills: 0 | Status: SHIPPED | OUTPATIENT
Start: 2024-11-13 | End: 2025-11-13

## 2024-11-13 NOTE — PROGRESS NOTES
Subjective     Patient ID: Conchita Luis is a 24 y.o. female.    Chief Complaint: Anxiety    Depression  Visit Type: initial  Onset of symptoms: 1 to 6 months ago  Progression since onset: unchanged  Patient presents with the following symptoms: depressed mood, excessive worry and nervousness/anxiety.  Patient is not experiencing: anhedonia, chest pain, choking sensation, compulsions, confusion, decreased concentration, dizziness, dry mouth, fatigue, feelings of hopelessness, feelings of worthlessness, hypersomnia, hyperventilation, impotence, insomnia, irritability, malaise, memory impairment, muscle tension, nausea, obsessions, palpitations, panic, psychomotor agitation, psychomotor retardation, restlessness, shortness of breath, suicidal ideas, suicidal planning, thoughts of death, weight gain and weight loss.  Frequency of symptoms: most days   Severity: severe   Aggravated by: nothing  Sleep quality: non-restorative  Risk factors: family history and major life event  Patient has a history of: depression (post partum)  No history of: anemia, anxiety/panic attacks, arrhythmia, asthma, bipolar disorder, CAD, CHF, chronic lung disease, fibromyalgia, hyperthyroidism, suicide attempt, mental illness and substance abuse  Treatment tried: SSRI (Took paxil with no adverse effects/allergic reaction)  Compliance with treatment: good (Pt states awaiting call from her mother's psychiatrist to establish care)  Improvement on treatment: mild (Pt also requesting letter for work stating depression diagnosis)  Pt also requesting STD testing; denies symptoms or known exposure.  Past Medical History:   Diagnosis Date    Herpes simplex virus (HSV) infection     Type I    Hypertension     gestational    Migraine      Social History     Socioeconomic History    Marital status: Single   Tobacco Use    Smoking status: Former     Types: Vaping with nicotine     Quit date: 2022     Years since quittin.2    Smokeless tobacco:  Never   Substance and Sexual Activity    Alcohol use: Not Currently    Drug use: Never    Sexual activity: Yes     Partners: Male     Birth control/protection: None     Social Drivers of Health     Financial Resource Strain: Low Risk  (7/15/2024)    Overall Financial Resource Strain (CARDIA)     Difficulty of Paying Living Expenses: Not very hard    Received from [facility]    Food Insecurity   Transportation Needs: No Transportation Needs (2/6/2024)    PRAPARE - Transportation     Lack of Transportation (Medical): No     Lack of Transportation (Non-Medical): No   Physical Activity: Insufficiently Active (7/15/2024)    Exercise Vital Sign     Days of Exercise per Week: 2 days     Minutes of Exercise per Session: 30 min   Stress: Stress Concern Present (7/15/2024)    [employer] - Occupational Stress Questionnaire     Feeling of Stress : Very much   Housing Stability: Low Risk  (2/6/2024)    Housing Stability Vital Sign     Unable to Pay for Housing in the Last Year: No     Number of Places Lived in the Last Year: 2     Unstable Housing in the Last Year: No     Past Surgical History:   Procedure Laterality Date    TONSILLECTOMY  2018    WISDOM TOOTH EXTRACTION Bilateral 2019       Review of Systems   Constitutional: Negative.  Negative for irritability, weight gain and weight loss.   HENT: Negative.     Eyes: Negative.    Respiratory: Negative.  Negative for choking and shortness of breath.    Cardiovascular: Negative.  Negative for palpitations.   Gastrointestinal: Negative.    Endocrine: Negative.    Genitourinary: Negative.  Negative for impotence.   Musculoskeletal: Negative.    Integumentary:  Negative.   Allergic/Immunologic: Negative.    Neurological: Negative.    Psychiatric/Behavioral:  Positive for depression and depressed mood. Negative for confusion, decreased concentration, substance abuse and suicidal ideas. The patient is nervous/anxious. The  patient does not have insomnia.           Objective     Physical Exam  Vitals and nursing note reviewed.   Constitutional:       Appearance: Normal appearance.   HENT:      Head: Normocephalic.      Right Ear: Tympanic membrane, ear canal and external ear normal.      Left Ear: Tympanic membrane and ear canal normal.      Nose: Nose normal.      Mouth/Throat:      Mouth: Mucous membranes are moist.      Pharynx: Oropharynx is clear.   Eyes:      Conjunctiva/sclera: Conjunctivae normal.      Pupils: Pupils are equal, round, and reactive to light.   Cardiovascular:      Rate and Rhythm: Normal rate and regular rhythm.      Pulses: Normal pulses.      Heart sounds: Normal heart sounds.   Pulmonary:      Effort: Pulmonary effort is normal.      Breath sounds: Normal breath sounds.   Abdominal:      General: Bowel sounds are normal.      Palpations: Abdomen is soft.   Musculoskeletal:         General: Normal range of motion.      Cervical back: Normal range of motion and neck supple.   Skin:     General: Skin is warm.      Capillary Refill: Capillary refill takes 2 to 3 seconds.   Neurological:      Mental Status: She is alert and oriented to person, place, and time.   Psychiatric:         Attention and Perception: Attention and perception normal.         Mood and Affect: Mood is depressed.         Behavior: Behavior normal. Behavior is cooperative.         Thought Content: Thought content normal.         Judgment: Judgment normal.          Assessment and Plan     1. Positive depression screening  Comments:  I have reviewed the positive depression score which warrants active treatment with psychotherapy and/or medications.  2. Depression, unspecified depression type  Orders:  -     Ambulatory referral/consult to Psychiatry; Future; Expected date: 11/20/2024  -     EScitalopram oxalate (LEXAPRO) 10 MG tablet; Take 1 tablet (10 mg total) by mouth once daily.  Dispense: 90 tablet; Refill: 0    3. Screen for STD (sexually  transmitted disease)  -     C. trachomatis/N. gonorrhoeae by AMP DNA  -     HIV 1/2 Ag/Ab (4th Gen); Future; Expected date: 11/13/2024  -     Treponema Pallidium Antibodies IgG, IgM; Future; Expected date: 11/13/2024  -     HEPATITIS PANEL, ACUTE; Future; Expected date: 11/13/2024  -     HSV 1 & 2, IgG; Future; Expected date: 11/13/2024  -     Vaginosis Screen by DNA Probe; Future; Expected date: 11/13/2024    Hydrate well  Rest  F/U in 4 w for assessment  Psychiatry recommended  Work letter sent via [employer]  Report to ER immediately if symptoms worsen or persist               No follow-ups on file.

## 2024-11-13 NOTE — LETTER
November 15, 2024    Conchita Luis  [id number] [address] [facility] [address]             [facility]  [address] [address] [facility] [address] [place] [address]  Phone: [phone]  Fax: [phone] To whom it may concern,    Conchita has been being treated for Depression here at our clinic.   If you have any questions or concerns, please don't hesitate to call.        Sincerely,        Dominique Arauz, DNP

## 2024-11-13 NOTE — PATIENT INSTRUCTIONS
"Hydrate well  Rest  F/U in 4 w for assessment  Psychiatry recommended  Report to ER immediately if symptoms worsen or persist    Escitalopram: Patient drug information  2024© UpToDate, Inc. and its affiliates and/or licensors. All Rights Reserved.  Access [employer] for additional drug information, tools, and databases.  Contributor Disclosures  For additional information see "???it?l?pram: Drug information" and "???ital??lizzette: Pediatric drug information"    You must carefully read the "Consumer Information Use and Disclaimer" below in order to understand and correctly use this information.  Brand Names: US  ???apro    Brand Names: Kristel  ???-???ital?pr?m;     ACT ???ital??lizzette ODT;     ?G-???it?l?pram;     ???-???ital??lizzette;     ??r?-???ital??r?m;     ???-???ital?pr?m;     Ci?r?l?x;     ????-???ital?pram;     ???-???ital??r?m;     ?-???ital??lizzette;     ??r-???it?lo?r?m;     ????-???it?lo?r?m;     ?????-???it?l??lizzette;     ???-???ital?pram;     ?R?-???it?l??lizzette;     ???-???ital??lizzette;     ????-???it?l??lizzette;     R?V?-???it?lo?lizzette;     SANDOZ ???ital?pram;     ??R?-???it?lo?r?m;     ??V?-???ital?pr?m    Warning  For all patients taking this drug:  Drugs like this one have raised the chance of suicidal thoughts or actions in children and young adults. The risk may be greater in people who have had these thoughts or actions in the past. All people who take this drug need to be watched closely. Call the doctor right away if signs like depression, nervousness, restlessness, grouchiness, panic attacks, or changes in mood or actions are new or worse. Call the doctor right away if any thoughts or actions of suicide occur.  Children:  This drug is not approved for use in all children. Talk with the doctor to be sure that this drug is right for your child.    What is this drug used for?  It is used to treat depression.  It is used to treat anxiety.  It may be given to you for other reasons. Talk with the doctor.    What do I " need to tell my doctor BEFORE I take this drug?  If you are allergic to this drug; any part of this drug; or any other drugs, foods, or substances. Tell your doctor about the allergy and what signs you had.  If you are taking any of these drugs: Linezolid or methylene blue.  If you are taking any of these drugs: Citalopram or pimozide.  If you have taken certain drugs for depression or Parkinson's disease in the last 14 days. This includes isocarboxazid, phenelzine, tranylcypromine, selegiline, or rasagiline. Very high blood pressure may happen.  This is not a list of all drugs or health problems that interact with this drug.  Tell your doctor and pharmacist about all of your drugs (prescription or OTC, natural products, vitamins) and health problems. You must check to make sure that it is safe for you to take this drug with all of your drugs and health problems. Do not start, stop, or change the dose of any drug without checking with your doctor.    What are some things I need to know or do while I take this drug?  Tell all of your health care providers that you take this drug. This includes your doctors, nurses, pharmacists, and dentists.  Avoid driving and doing other tasks or actions that call for you to be alert until you see how this drug affects you.  Do not stop taking this drug all of a sudden without calling your doctor. You may have a greater risk of side effects. If you need to stop this drug, you will want to slowly stop it as ordered by your doctor.  Avoid drinking alcohol while taking this drug.  Talk with your doctor before you use marijuana, other forms of cannabis, or prescription or OTC drugs that may slow your actions.  In depression, sleep and appetite may get better soon after starting this drug. Other depression signs may take up to 4 weeks to get better.  This drug may raise the chance of bleeding. Sometimes, bleeding can be life-threatening. Talk with the doctor.  This drug can cause low  sodium levels. Very low sodium levels can be life-threatening, leading to seizures, passing out, trouble breathing, or death.  If you are 65 or older, use this drug with care. You could have more side effects.  This drug may affect growth in children and teens in some cases. They may need regular growth checks. Talk with the doctor.  Tell your doctor if you are pregnant, may be pregnant, or plan to get pregnant while taking this drug. You will need to talk about the benefits and risks to you and the baby. Taking this drug in the third trimester of pregnancy may raise your risk of bleeding after delivery and may lead to some health problems in the .  Tell your doctor if you are breast-feeding. You will need to talk about any risks to your baby.    What are some side effects that I need to call my doctor about right away?  WARNING/CAUTION: Even though it may be rare, some people may have very bad and sometimes deadly side effects when taking a drug. Tell your doctor or get medical help right away if you have any of the following signs or symptoms that may be related to a very bad side effect:  Signs of an allergic reaction, like rash; hives; itching; red, swollen, blistered, or peeling skin with or without fever; wheezing; tightness in the chest or throat; trouble breathing, swallowing, or talking; unusual hoarseness; or swelling of the mouth, face, lips, tongue, or throat.  Signs of low sodium levels like headache, trouble focusing, memory problems, feeling confused, weakness, seizures, or change in balance.  Signs of bleeding like throwing up or coughing up blood; vomit that looks like coffee grounds; blood in the urine; black, red, or tarry stools; bleeding from the gums; abnormal vaginal bleeding; bruises without a cause or that get bigger; or bleeding you cannot stop.  Seizures.  Fever or chills.  Painful erection (hard penis) or an erection that lasts for longer than 4 hours.  Sex problems have happened  with drugs like this one. This includes lowered interest in sex, trouble having an orgasm, ejaculation problems, or trouble getting or keeping an erection. If you have any questions, talk with your doctor.  Some people may have a higher chance of eye problems with this drug. Your doctor may want you to have an eye exam to see if you have a higher chance of these eye problems. Call your doctor right away if you have eye pain, change in eyesight, or swelling or redness in or around the eye.  A severe and sometimes deadly problem called serotonin syndrome may happen. The risk may be greater if you also take certain other drugs. Call your doctor right away if you have agitation; change in balance; confusion; hallucinations; fever; fast or abnormal heartbeat; flushing; muscle twitching or stiffness; seizures; shivering or shaking; sweating a lot; severe diarrhea, upset stomach, or throwing up; or very bad headache.    What are some other side effects of this drug?  All drugs may cause side effects. However, many people have no side effects or only have minor side effects. Call your doctor or get medical help if any of these side effects or any other side effects bother you or do not go away:  Feeling dizzy, sleepy, tired, or weak.  Upset stomach.  Diarrhea or constipation.  Dry mouth.  Trouble sleeping.  Sweating a lot.  Flu-like signs.  Runny nose.  Headache.  Yawning.  These are not all of the side effects that may occur. If you have questions about side effects, call your doctor. Call your doctor for medical advice about side effects.  You may report side effects to your national health agency.    How is this drug best taken?  Use this drug as ordered by your doctor. Read all information given to you. Follow all instructions closely.  All products:  Take with or without food.  Keep taking this drug as you have been told by your doctor or other health care provider, even if you feel well.  Oral solution:  Measure  liquid doses carefully. Use the measuring device that comes with this drug. If there is none, ask the pharmacist for a device to measure this drug.    What do I do if I miss a dose?  Take a missed dose as soon as you think about it.  If it is close to the time for your next dose, skip the missed dose and go back to your normal time.  Do not take 2 doses at the same time or extra doses.    How do I store and/or throw out this drug?  Store at room temperature in a dry place. Do not store in a bathroom.  Keep all drugs in a safe place. Keep all drugs out of the reach of children and pets.  Throw away unused or  drugs. Do not flush down a toilet or pour down a drain unless you are told to do so. Check with your pharmacist if you have questions about the best way to throw out drugs. There may be drug take-back programs in your area.    General drug facts  If your symptoms or health problems do not get better or if they become worse, call your doctor.  Do not share your drugs with others and do not take anyone else's drugs.  Some drugs may have another patient information leaflet. If you have any questions about this drug, please talk with your doctor, nurse, pharmacist, or other health care provider.  If you think there has been an overdose, call your poison control center or get medical care right away. Be ready to tell or show what was taken, how much, and when it happened.      Hi Conchita,     If you are due for any health screening(s) below please notify me so we can arrange them to be ordered and scheduled. Most healthy patients at your age complete them, but you are free to accept or refuse.     If you can't do it, I'll definitely understand. If you can, I'd certainly appreciate it!    All of your core healthy metrics are met.

## 2024-11-14 LAB
HSV1 IGG SERPL QL IA: POSITIVE
HSV2 IGG SERPL QL IA: NEGATIVE

## 2024-11-15 LAB
BACTERIAL VAGINOSIS DNA: NOT DETECTED
C TRACH DNA SPEC QL NAA+PROBE: DETECTED
CANDIDA GLABRATA/KRUSEI: NOT DETECTED
CANDIDA RRNA VAG QL PROBE: NOT DETECTED
N GONORRHOEA DNA SPEC QL NAA+PROBE: NOT DETECTED
TRICHOMONAS VAGINALIS: NOT DETECTED

## 2024-11-18 ENCOUNTER — TELEPHONE (OUTPATIENT)
Dept: FAMILY MEDICINE | Facility: CLINIC | Age: 24
End: 2024-11-18
Payer: MEDICAID

## 2024-11-18 RX ORDER — DOXYCYCLINE HYCLATE 100 MG
100 TABLET ORAL 2 TIMES DAILY
Qty: 14 TABLET | Refills: 0 | Status: SHIPPED | OUTPATIENT
Start: 2024-11-18 | End: 2024-11-25

## 2024-11-22 ENCOUNTER — TELEPHONE (OUTPATIENT)
Dept: FAMILY MEDICINE | Facility: CLINIC | Age: 24
End: 2024-11-22
Payer: MEDICAID

## 2024-11-22 NOTE — TELEPHONE ENCOUNTER
----- Message from Karla sent at 11/22/2024 10:45 AM CST -----  Name of Caller:Eric Luis   Best Call Back Number:.[phone]    Additional Information: Patient stated her employer received the paperwork for medical leave of absence but couldn't open it. Please resend the paperwork to fax number. [phone]. Thx.EL

## 2024-11-26 ENCOUNTER — TELEPHONE (OUTPATIENT)
Dept: FAMILY MEDICINE | Facility: CLINIC | Age: 24
End: 2024-11-26
Payer: MEDICAID

## 2024-11-26 NOTE — TELEPHONE ENCOUNTER
I have attempted to contact this patient by phone with the following results: no answer, left message to return my call on answering machine. Needs virtual visit for paperwork to be filled out.

## 2024-12-12 ENCOUNTER — TELEPHONE (OUTPATIENT)
Dept: FAMILY MEDICINE | Facility: CLINIC | Age: 24
End: 2024-12-12
Payer: MEDICAID

## 2024-12-12 NOTE — TELEPHONE ENCOUNTER
----- Message from Karla sent at 12/12/2024 11:44 AM CST -----    Name of Caller:IkerConchita Toby   Best Call Back Number:.[phone]   Additional Information: Patient stated she dropped off paperwork for a leave of absence but gave the wrong fax number. The correct fax number for [employer] is [phone]. Thx.EL

## 2024-12-13 ENCOUNTER — TELEPHONE (OUTPATIENT)
Dept: FAMILY MEDICINE | Facility: CLINIC | Age: 24
End: 2024-12-13

## 2024-12-13 ENCOUNTER — OFFICE VISIT (OUTPATIENT)
Dept: FAMILY MEDICINE | Facility: CLINIC | Age: 24
End: 2024-12-13
Payer: MEDICAID

## 2024-12-13 DIAGNOSIS — Z02.89 ENCOUNTER FOR COMPLETION OF FORM WITH PATIENT: ICD-10-CM

## 2024-12-13 DIAGNOSIS — Z09 FOLLOW-UP EXAM: Primary | ICD-10-CM

## 2024-12-13 DIAGNOSIS — Z51.89 ENCOUNTER FOR MEDICATION ADJUSTMENT: ICD-10-CM

## 2024-12-13 DIAGNOSIS — F32.A DEPRESSION, UNSPECIFIED DEPRESSION TYPE: ICD-10-CM

## 2024-12-13 RX ORDER — ESCITALOPRAM OXALATE 20 MG/1
20 TABLET ORAL DAILY
Qty: 90 TABLET | Refills: 0 | Status: SHIPPED | OUTPATIENT
Start: 2024-12-13 | End: 2025-12-13

## 2024-12-13 NOTE — PATIENT INSTRUCTIONS
F/U in 4 w for assessment (sridhar to see psychiatry Michael 3, 2025)  Hydrate well  Rest  Report to ER immediately if symptoms worsen or persist

## 2024-12-13 NOTE — PROGRESS NOTES
Subjective     Patient ID: Cocnhita Luis is a 24 y.o. female.    Chief Complaint: No chief complaint on file.  The patient location is: [place]  The chief complaint leading to consultation is: FMLA form, depression f/u    Visit type: audiovisual    Face to Face time with patient: 20 min  minutes of total time spent on the encounter, which includes face to face time and non-face to face time preparing to see the patient (eg, review of tests), Obtaining and/or reviewing separately obtained history, Documenting clinical information in the electronic or other health record, Independently interpreting results (not separately reported) and communicating results to the patient/family/caregiver, or Care coordination (not separately reported).         Each patient to whom he or she provides medical services by telemedicine is:  (1) informed of the relationship between the physician and patient and the respective role of any other health care provider with respect to management of the patient; and (2) notified that he or she may decline to receive medical services by telemedicine and may withdraw from such care at any time.    Notes:     Depression  Visit Type: follow-up (Pt prescribed lexapro 10 mg on 11/13/24; requests dose increase; states seeing psychiatry 12/3/24)  Patient presents with the following symptoms: depressed mood.  Patient is not experiencing: anhedonia, chest pain, choking sensation, compulsions, confusion, decreased concentration, dizziness, dry mouth, excessive worry, fatigue, feelings of hopelessness, feelings of worthlessness, hypersomnia, hyperventilation, impotence, insomnia, irritability, malaise, memory impairment, muscle tension, nausea, nervousness/anxiety, obsessions, palpitations, panic, psychomotor agitation, psychomotor retardation, restlessness, shortness of breath, suicidal ideas, suicidal planning, thoughts of death, weight gain and weight loss.  Frequency of symptoms: most days    Severity: moderate   Sleep quality: fair  Nighttime awakenings: occasional  Compliance with medications:  %    Pt also requesting FMLA form completion from 24-1/3/24.  Past Medical History:   Diagnosis Date    Herpes simplex virus (HSV) infection     Type I    Hypertension     gestational    Migraine      Social History     Socioeconomic History    Marital status: Single   Tobacco Use    Smoking status: Former     Types: Vaping with nicotine     Quit date: 2022     Years since quittin.3    Smokeless tobacco: Never   Substance and Sexual Activity    Alcohol use: Not Currently    Drug use: Never    Sexual activity: Yes     Partners: Male     Birth control/protection: None     Social Drivers of Health     Financial Resource Strain: Low Risk  (7/15/2024)    Overall Financial Resource Strain (CARDIA)     Difficulty of Paying Living Expenses: Not very hard    Received [facility]    Food Insecurity   Transportation Needs: No Transportation Needs (2024)    PRAPARE - Transportation     Lack of Transportation (Medical): No     Lack of Transportation (Non-Medical): No   Physical Activity: Insufficiently Active (7/15/2024)    Exercise Vital Sign     Days of Exercise per Week: 2 days     Minutes of Exercise per Session: 30 min   Stress: Stress Concern Present (7/15/2024)    [employer] - Occupational Stress Questionnaire     Feeling of Stress : Very much   Housing Stability: Low Risk  (2024)    Housing Stability Vital Sign     Unable to Pay for Housing in the Last Year: No     Number of Places Lived in the Last Year: 2     Unstable Housing in the Last Year: No     Past Surgical History:   Procedure Laterality Date    TONSILLECTOMY  2018    WISDOM TOOTH EXTRACTION Bilateral 2019       Review of Systems   Constitutional: Negative.  Negative for activity change, irritability, unexpected weight change, weight gain and weight loss.   HENT: Negative.   Negative for hearing loss, rhinorrhea and trouble swallowing.    Eyes: Negative.  Negative for discharge and visual disturbance.   Respiratory: Negative.  Negative for choking, chest tightness, shortness of breath and wheezing.    Cardiovascular: Negative.  Negative for palpitations.   Gastrointestinal: Negative.  Negative for blood in stool, constipation and diarrhea.   Endocrine: Negative.  Negative for polydipsia and polyuria.   Genitourinary: Negative.  Negative for difficulty urinating, dysuria, hematuria, impotence and menstrual problem.   Musculoskeletal: Negative.    Integumentary:  Negative.   Allergic/Immunologic: Negative.    Neurological: Negative.    Psychiatric/Behavioral:  Positive for depression and depressed mood. Negative for confusion, decreased concentration, dysphoric mood and suicidal ideas. The patient is not nervous/anxious and does not have insomnia.           Objective     Physical Exam  Constitutional:       Appearance: Normal appearance.   Neurological:      Mental Status: She is alert.            Assessment and Plan     1. Follow-up exam  2. Depression, unspecified depression type  3. Encounter for completion of form with patient  4. Encounter for medication adjustment  -     EScitalopram oxalate (LEXAPRO) 20 MG tablet; Take 1 tablet (20 mg total) by mouth once daily.  Dispense: 90 tablet; Refill: 0  F/U in 4 w for assessment (sridhar to see psychiatry Michael 3, 2025)  Hydrate well  Rest  Report to ER immediately if symptoms worsen or persist         No follow-ups on file.

## 2024-12-17 ENCOUNTER — TELEPHONE (OUTPATIENT)
Dept: PSYCHIATRY | Facility: CLINIC | Age: 24
End: 2024-12-17
Payer: MEDICAID

## 2024-12-17 NOTE — TELEPHONE ENCOUNTER
Sherrie Bryant, JULIO CESAR Sullivan  Good morning, Z codes can not be the primary diagnosis code, another diagnosis code will need to be added before the Z code for authorization. Thank you.     Good morning this is a message I received. Could you possibly provide us with an F code for the Diagnosis?

## 2024-12-18 ENCOUNTER — TELEPHONE (OUTPATIENT)
Dept: FAMILY MEDICINE | Facility: CLINIC | Age: 24
End: 2024-12-18
Payer: MEDICAID

## 2024-12-18 NOTE — TELEPHONE ENCOUNTER
----- Message from Pedro sent at 12/18/2024  9:21 AM CST -----  Name of Who is Calling:ALFONSO RDZ [[id number]]        What is the request in detail:Pt called in stating to have [employer] forms faxed to [phone] also.Please advise thank you       Can the clinic reply by [facility]:NO        What Number to Call Back if not in [facility]:Telephone Information:  Mobile          [phone]

## 2024-12-23 ENCOUNTER — TELEPHONE (OUTPATIENT)
Dept: FAMILY MEDICINE | Facility: CLINIC | Age: 24
End: 2024-12-23
Payer: MEDICAID

## 2024-12-23 NOTE — TELEPHONE ENCOUNTER
----- Message from Joanne sent at 12/23/2024  2:58 PM CST -----  .Type: Patient Call Back        Who called:      Patient   What is the request in detail:  called in concerning paperwork that was discussed . Patient wants to email paperwork . Please call back     Can the clinic reply by [facility]?           Would the patient rather a call back or a response via [facility]?   call      Best call back number:  [phone]

## 2024-12-30 ENCOUNTER — TELEPHONE (OUTPATIENT)
Dept: FAMILY MEDICINE | Facility: CLINIC | Age: 24
End: 2024-12-30
Payer: MEDICAID

## 2024-12-30 NOTE — TELEPHONE ENCOUNTER
----- Message from Jimena sent at 12/30/2024  8:04 AM CST -----  Contact: pt  Type:  Request a callback     Name of Caller: anabell  Best Call Back Number: [phone]  Additional Information:  pt is calling in rgd to her paperwork for leave absence that she dropped off to be completed.  Please call to advise of the status

## 2025-01-14 ENCOUNTER — TELEPHONE (OUTPATIENT)
Dept: FAMILY MEDICINE | Facility: CLINIC | Age: 25
End: 2025-01-14
Payer: MEDICAID

## 2025-01-14 NOTE — TELEPHONE ENCOUNTER
----- Message from Emeli sent at 1/14/2025 12:55 PM CST -----  Contact: [phone] .[email or web address]     Patient is calling for Medical Advice regarding:appt for today she is asking to have changed to the [place] office     How long has patient had these symptoms:no to for off     Pharmacy name and phone#:    Patient wants a call back or thru [facility]:call back     Comments:  She will cancel today if there is a soon appt for the [place] office   Please advise patient replies from provider may take up to 48 hours.

## 2025-01-16 ENCOUNTER — LAB VISIT (OUTPATIENT)
Dept: LAB | Facility: HOSPITAL | Age: 25
End: 2025-01-16
Attending: INTERNAL MEDICINE
Payer: MEDICAID

## 2025-01-16 ENCOUNTER — OFFICE VISIT (OUTPATIENT)
Dept: FAMILY MEDICINE | Facility: CLINIC | Age: 25
End: 2025-01-16
Payer: MEDICAID

## 2025-01-16 VITALS
HEART RATE: 67 BPM | TEMPERATURE: 98 F | DIASTOLIC BLOOD PRESSURE: 70 MMHG | HEIGHT: 65 IN | WEIGHT: 185 LBS | BODY MASS INDEX: 30.82 KG/M2 | SYSTOLIC BLOOD PRESSURE: 111 MMHG

## 2025-01-16 DIAGNOSIS — E01.0 THYROMEGALY: Primary | ICD-10-CM

## 2025-01-16 DIAGNOSIS — Z11.3 ROUTINE SCREENING FOR STI (SEXUALLY TRANSMITTED INFECTION): ICD-10-CM

## 2025-01-16 DIAGNOSIS — F32.0 CURRENT MILD EPISODE OF MAJOR DEPRESSIVE DISORDER WITHOUT PRIOR EPISODE: ICD-10-CM

## 2025-01-16 PROCEDURE — 99213 OFFICE O/P EST LOW 20 MIN: CPT | Mod: PBBFAC,PO | Performed by: INTERNAL MEDICINE

## 2025-01-16 PROCEDURE — G2211 COMPLEX E/M VISIT ADD ON: HCPCS | Mod: S$PBB,,, | Performed by: INTERNAL MEDICINE

## 2025-01-16 PROCEDURE — 3078F DIAST BP <80 MM HG: CPT | Mod: CPTII,,, | Performed by: INTERNAL MEDICINE

## 2025-01-16 PROCEDURE — 99999 PR PBB SHADOW E&M-EST. PATIENT-LVL III: CPT | Mod: PBBFAC,,, | Performed by: INTERNAL MEDICINE

## 2025-01-16 PROCEDURE — 87591 N.GONORRHOEAE DNA AMP PROB: CPT | Performed by: INTERNAL MEDICINE

## 2025-01-16 PROCEDURE — 3008F BODY MASS INDEX DOCD: CPT | Mod: CPTII,,, | Performed by: INTERNAL MEDICINE

## 2025-01-16 PROCEDURE — 99214 OFFICE O/P EST MOD 30 MIN: CPT | Mod: S$PBB,,, | Performed by: INTERNAL MEDICINE

## 2025-01-16 PROCEDURE — 3074F SYST BP LT 130 MM HG: CPT | Mod: CPTII,,, | Performed by: INTERNAL MEDICINE

## 2025-01-22 ENCOUNTER — PATIENT MESSAGE (OUTPATIENT)
Dept: RADIOLOGY | Facility: HOSPITAL | Age: 25
End: 2025-01-22
Payer: MEDICAID

## 2025-01-22 LAB
C TRACH DNA SPEC QL NAA+PROBE: DETECTED
N GONORRHOEA DNA SPEC QL NAA+PROBE: NOT DETECTED

## 2025-01-23 ENCOUNTER — E-VISIT (OUTPATIENT)
Dept: FAMILY MEDICINE | Facility: CLINIC | Age: 25
End: 2025-01-23
Payer: MEDICAID

## 2025-01-23 DIAGNOSIS — A74.9 CHLAMYDIA INFECTION: Primary | ICD-10-CM

## 2025-01-23 PROCEDURE — 99499 UNLISTED E&M SERVICE: CPT | Mod: ,,, | Performed by: PHYSICIAN ASSISTANT

## 2025-01-23 RX ORDER — DOXYCYCLINE 100 MG/1
100 CAPSULE ORAL 2 TIMES DAILY
Qty: 14 CAPSULE | Refills: 0 | Status: SHIPPED | OUTPATIENT
Start: 2025-01-23 | End: 2025-01-30

## 2025-01-23 NOTE — PROGRESS NOTES
Patient ID: Conchita Luis is a 24 y.o. female.    Chief Complaint: General Illness (Entered automatically based on patient selection in [facility].)    The patient initiated a request through [facility] on 1/23/2025 for evaluation and management with a chief complaint of General Illness (Entered automatically based on patient selection in [facility].)     I evaluated the questionnaire submission on 1/23/25.    [facility]-Medication    1/23/2025  1:29 PM CST - Filed by Patient   What do you need help with? Medication Request   Do you agree to participate in an E-Visit? Yes   If you have any of the following symptoms, please present to your local emergency room or call 911:  I acknowledge   Medication requests for narcotics will not be addressed via an E-Visit.  Please schedule an appointment. I acknowledge   Do you have any of the following pregnancy-related conditions? None   Do you want to address a new or existing medication? I would like to start a new medication that I do not already take   What is the main issue you would like addressed today? Request for antibiotics per positive chylmydia test. Will not be able to pick upphone due to inpatient/outpatient therapy.   What is the name of the medication that you would like to start? azithromycin or doxycycline   Have you taken a similar medication in the past? Yes   What was the name of the similar medication? azithromycin or doxycycline   Why are you no longer on that medication? No specific reason    What medical condition is the  medication intended to treat? chlymydia   Provide any additional information you feel is important. now seeing side effects;  continuous leakage;  need pantyliner for support   Please attach any relevant images or files    Are you able to take your vital signs? No          Active Problem List with Overview Notes    Diagnosis Date Noted    Current mild episode of major depressive disorder without prior episode 04/24/2024      -worsening since having her baby in 2/2024  -plan to start Zoloft 25 mg QD  -discussed SSRI/SNRI as first-line treatment for this condition  -discussed risk of discontinuing this medication without tapering  -patient was educated, advised of side effects, and all questions were answered. Patient voiced understanding  -patient will follow up routinely and notify us if having any side effects or worsening or persistent symptoms. ER precautions were given.      Elevated blood pressure affecting pregnancy in third trimester, antepartum 01/17/2024    Hypokalemia 12/28/2023    KATHY (generalized anxiety disorder) 02/09/2023    Iron deficiency anemia due to chronic blood loss 11/11/2020    Vitamin D deficiency 11/11/2020      Recent Labs Obtained:  No visits with results within 7 Day(s) from this visit.   Latest known visit with results is:   Lab Visit on 01/16/2025   Component Date Value Ref Range Status    Chlamydia, Amplified DNA 01/16/2025 Detected (A)  Not Detected Final    N gonorrhoeae, amplified DNA 01/16/2025 Not Detected  Not Detected Final       Encounter Diagnosis   Name Primary?    Chlamydia infection Yes        No orders of the defined types were placed in this encounter.     Medications Ordered This Encounter   Medications    doxycycline (VIBRAMYCIN) 100 MG Cap     Sig: Take 1 capsule (100 mg total) by mouth 2 (two) times daily. for 7 days     Dispense:  14 capsule     Refill:  0        E-Visit Time Tracking:

## 2025-01-26 NOTE — PROGRESS NOTES
Assessment/Plan:    1. Thyromegaly  -     US Thyroid; Future; Expected date: 01/16/2025    2. Routine screening for STI (sexually transmitted infection)  -     C. trachomatis/N. gonorrhoeae by [facility]; Urine; Future; Expected date: 01/16/2025    3. Current mild episode of major depressive disorder without prior episode  Overview:  -chronic but recent worsening after loss of a child  -recent admitted to psych facility and now reports that she is doing much better  -she is now under the care of psychiatrist and is still participating in an intensive outpatient program  -she remains on lexapro daily and reports that she is doing well with the medication, without AE  -discussed risk of discontinuing this medication without tapering  -patient was educated, advised of side effects, and all questions were answered. Patient voiced understanding  -patient will follow up routinely and notify us if having any side effects or worsening or persistent symptoms. ER precautions were given.          Visit today included increased complexity associated with the care of the episodic problem(s) addressed and managing the longitudinal care of the patient due to the serious and/or complex managed problem(s). See above assessment/plan.      Lyla Miles MD  _____________________________________________________________________________________________________________________________________________________    CC: follow up    Patient is in clinic today as an established patient.    History of Present Illness  The patient is a 24-year-old female who is here for follow up.    Patient recently admitted to psychiatric facility for depression after the loss of a child. She was previously on Zoloft, which was switched to Lexapro by Dominique on 12/13/2024. She has been on Lexapro for about a month and finds it helpful. She wants to continue with the medication and group therapy. She is currently engaged in intensive outpatient therapy, with  her most recent session held yesterday. She is under the care of a psychiatrist and actively participates in group therapy sessions, which she reports as beneficial.    She reports experiencing soreness in the anterior aspect of her neck, which her mother has advised to be evaluated. She does not experience any discomfort in the posterior region of her neck. She also reports no radiating pain, numbness, or tingling sensations in her upper extremities. Her cervical range of motion remains intact, with no associated pain during movement. The soreness is described as tender and achy, occurring bilaterally and sporadically. She has a history of thyroidectomy.    She expresses a desire to undergo STD screening to confirm the efficacy of her antibiotic treatment. She was previously diagnosed with chlamydia by Dominique but was asymptomatic at the time of diagnosis.     No other new complaints today.  Remaining chronic conditions have been reviewed and remain stable. Further detail as stated above.      Current Outpatient Medications on File Prior to Visit   Medication Sig Dispense Refill    EScitalopram oxalate (LEXAPRO) 20 MG tablet Take 1 tablet (20 mg total) by mouth once daily. 90 tablet 0     No current facility-administered medications on file prior to visit.       Review of Systems   Constitutional:  Negative for chills, diaphoresis, fatigue and fever.   HENT:  Negative for congestion, ear pain, postnasal drip, sinus pain and sore throat.    Eyes:  Negative for pain and redness.   Respiratory:  Negative for cough, chest tightness and shortness of breath.    Cardiovascular:  Negative for chest pain and leg swelling.   Gastrointestinal:  Negative for abdominal pain, constipation, diarrhea, nausea and vomiting.   Genitourinary:  Negative for dysuria and hematuria.   Musculoskeletal:  Negative for arthralgias and joint swelling.   Skin:  Negative for rash.   Neurological:  Negative for dizziness, syncope and headaches.  "  Psychiatric/Behavioral:  Negative for dysphoric mood. The patient is not nervous/anxious.      Vitals:    01/16/25 0934   BP: 111/70   BP Location: Right arm   Patient Position: Sitting   Pulse: 67   Temp: 98.3 °F (36.8 °C)   Weight: 83.9 kg (185 lb)   Height: 5' 5" (1.651 m)       Wt Readings from Last 3 Encounters:   01/16/25 83.9 kg (185 lb)   11/13/24 84.1 kg (185 lb 4.8 oz)   04/24/24 81.4 kg (179 lb 6.4 oz)       Physical Exam  Constitutional:       General: She is not in acute distress.     Appearance: Normal appearance. She is well-developed.   HENT:      Head: Normocephalic and atraumatic.   Eyes:      Conjunctiva/sclera: Conjunctivae normal.   Neck:      Comments: Possible enlargement of thyroid  Cardiovascular:      Rate and Rhythm: Normal rate and regular rhythm.      Pulses: Normal pulses.      Heart sounds: Normal heart sounds. No murmur heard.  Pulmonary:      Effort: Pulmonary effort is normal. No respiratory distress.      Breath sounds: Normal breath sounds.   Abdominal:      General: Bowel sounds are normal. There is no distension.      Palpations: Abdomen is soft.      Tenderness: There is no abdominal tenderness.   Musculoskeletal:         General: Normal range of motion.      Cervical back: Normal range of motion and neck supple.   Skin:     General: Skin is warm and dry.      Findings: No rash.   Neurological:      General: No focal deficit present.      Mental Status: She is alert and oriented to person, place, and time.   Psychiatric:         Mood and Affect: Mood normal.         Behavior: Behavior normal.     DISCLAIMER: This note was compiled by using a speech recognition dictation system and therefore please be aware that typographical / speech recognition errors can and do occur.  Please contact me if you see any errors specifically.  Consent was obtained for [employer] recording system prior to the visit.  "

## 2025-03-05 DIAGNOSIS — F41.1 GAD (GENERALIZED ANXIETY DISORDER): Primary | ICD-10-CM

## 2025-03-05 NOTE — TELEPHONE ENCOUNTER
Refill Routing Note   Medication(s) are not appropriate for processing by [facility] for the following reason(s):        No active prescription written by provider  New or recently adjusted medication    [facility] action(s):  Defer               Appointments  past 12m or future 3m with PCP    Date Provider   Last Visit   1/16/2025 Lyla Miles MD   Next Visit   Visit date not found Lyla Miles MD   ED visits in past 90 days: 0        Note composed:3:37 PM 03/05/2025

## 2025-03-07 RX ORDER — ESCITALOPRAM OXALATE 20 MG/1
20 TABLET ORAL DAILY
Qty: 90 TABLET | Refills: 3 | Status: SHIPPED | OUTPATIENT
Start: 2025-03-07 | End: 2026-03-02

## 2025-08-28 ENCOUNTER — TELEPHONE (OUTPATIENT)
Dept: FAMILY MEDICINE | Facility: CLINIC | Age: 25
End: 2025-08-28
Payer: MEDICAID